# Patient Record
Sex: FEMALE | Race: WHITE | NOT HISPANIC OR LATINO | Employment: UNEMPLOYED | URBAN - NONMETROPOLITAN AREA
[De-identification: names, ages, dates, MRNs, and addresses within clinical notes are randomized per-mention and may not be internally consistent; named-entity substitution may affect disease eponyms.]

---

## 2017-11-01 ENCOUNTER — HOSPITAL ENCOUNTER (OUTPATIENT)
Dept: RADIATION ONCOLOGY | Facility: HOSPITAL | Age: 57
Setting detail: RADIATION/ONCOLOGY SERIES
End: 2017-11-01

## 2017-11-09 ENCOUNTER — OFFICE VISIT (OUTPATIENT)
Dept: RADIATION ONCOLOGY | Facility: HOSPITAL | Age: 57
End: 2017-11-09

## 2017-11-09 VITALS
DIASTOLIC BLOOD PRESSURE: 79 MMHG | WEIGHT: 293 LBS | BODY MASS INDEX: 45.99 KG/M2 | HEIGHT: 67 IN | SYSTOLIC BLOOD PRESSURE: 162 MMHG

## 2017-11-09 DIAGNOSIS — Z92.3 HISTORY OF RADIATION THERAPY: ICD-10-CM

## 2017-11-09 DIAGNOSIS — Z08 ENCOUNTER FOR FOLLOW-UP SURVEILLANCE OF CERVICAL CANCER: ICD-10-CM

## 2017-11-09 DIAGNOSIS — R32 URINARY INCONTINENCE, UNSPECIFIED TYPE: ICD-10-CM

## 2017-11-09 DIAGNOSIS — Z85.41 ENCOUNTER FOR FOLLOW-UP SURVEILLANCE OF CERVICAL CANCER: ICD-10-CM

## 2017-11-09 DIAGNOSIS — F17.200 CURRENT EVERY DAY SMOKER: ICD-10-CM

## 2017-11-09 DIAGNOSIS — C53.9 MALIGNANT NEOPLASM OF CERVIX, UNSPECIFIED SITE (HCC): Primary | ICD-10-CM

## 2017-11-09 PROCEDURE — G0123 SCREEN CERV/VAG THIN LAYER: HCPCS | Performed by: RADIOLOGY

## 2017-11-09 PROCEDURE — G0463 HOSPITAL OUTPT CLINIC VISIT: HCPCS | Performed by: RADIOLOGY

## 2017-11-09 RX ORDER — TIZANIDINE 4 MG/1
4 TABLET ORAL NIGHTLY PRN
COMMUNITY

## 2017-11-09 NOTE — PROGRESS NOTES
[facility]SMaryMD Rosanna Nichols, SEBASTIENN, PA-C  ____________________________________________________________  [facility]  Department of Radiation Oncology  [address] [place] [id number]  Office:  [phone]  Fax: [phone]    DATE:   11/09/2017    PATIENT:   Margarita Del Angel   1960                                 MEDICAL RECORD #:  [id number]    Reason for Visit:    1. Malignant neoplasm of cervix, unspecified site    2. History of radiation therapy    3. Current every day smoker    4. Urinary incontinence, unspecified type    5. Encounter for follow-up surveillance of cervical cancer       BRIEF HISTORY:   Margarita Del Angel is a very pleasant 57 y.o. patient that is status post radiation therapy for Stage IIB (T2b,N0,M0) cervical cancer with completion on 11/13/2014. This patient returns to the clinic today for routine follow up exam.  is doing well with no new significant treatment or disease related complaints. Reports increased urinary frequency and vaginal dryness, denies vaginal bleeding. Recent pap smear on 9/21/2016 was negative. She continues to follow with .    Ms. Del Angel completed a definitive course of radiation therapy for treatment of stage IIB (T2b N0 M0) cervix cancer. This patient presented to Dr. Merritt Payan for pelvic examination on 6/11/14.  A tumor was noted on the cervix with Pap smear positive for malignant cells.  A biopsy of the endometrium was also performed, showing poorly differential  squamous cell carcinoma, p63 positive.  A CT scan of abdomen and pelvis on 6/23/14 showed a bulky appearing cervix without CT evidence of parametrial extension of tumor.  The patient was referred to GYN oncology at [facility] and subsequently seen by Dr. Yogesh Genao on 7/9/14.  She was diagnosed with a clinical stage IIB cervical carcinoma with recommendations for definitive chemoradiation.  She received  EBRT with concomitant weekly Cisplatin as a radiosensitizer, followed by HDR brachytherapy. A dose of 5040 cGy was delivered to the pelvis + 5 HDR brachytherapy implants (3000 cGy). She completed the prescribed course of therapy on 11/13/14.    ONCOLOGY HISTORY     Cervix cancer     Initial Diagnosis     Cervix cancer    Staging form: Cervix Uteri, AJCC V7    - Clinical stage from 7/9/2014: Stage IIB (T2b, N0, M0) - Unsigned           6/11/2014 Procedure     Pap smear - epithelial cell abnormality, malignant cells are present  Endometrial biopsy - poorly differentiated squamous cell carcinoma, p63 with focal areas of positivity         6/16/2014 Imaging     Pelvis US:  Difficult to assess transabdominal images via secondary to patient's body habitus  No gross abnormality         6/16/2014 Imaging     Transvaginal US:  Endometrial thickness 4.0mm  Uterus appears irregular in shape consistent with leiomyomatous uterus; recommend clinical correlation          6/18/2014 Imaging     CXR:  No acute cardiac or pulmonary process         6/23/2014 Imaging     CT Abd/Pelvis:  Bulky appearing cervix, compatible with cervical carcinoma; within the inferior anterior cervix there is focal air and low attenuation change of uncertain significance; may be related to instrumentation, tumor necrosis would be difficult to exclude  Pelvic sidewalls are maintained without CT evidence of significant parametrial extension of tumor  No CT evidence of metastatic disease  Diastasis of the anterior abdominal wall associated with uncomplicated hernia expected in the region of the umbilicus         8/11/2014 - 11/3/2014 Radiation     Radiation OncologyTreatment Course:  Margarita Del Angel received 5040 cGy in 33 fractions to pelvis.  Patient also received 3000 cGy via HDR brachytherapy to cervix in 5 fractions.         1/6/2015 Imaging     CT Abd/Pelvis:  Mild soft tissue fullness in the region of the left aspect of the cervix which could represent  the site of malignancy; recommend correlation with physical examination and pelvic ultrasound if needed  Scattered colonic diverticula  Small, fat containing umbilical hernia  Atherosclerotic disease within the abdominal aorta and iliac arteries  Otherwise unremarkable CT of the abdomen and pelvis with no evidence of metastatic disease          History obtained from  PATIENT and CHART    PAST MEDICAL HISTORY   Past Medical History:   Diagnosis Date   • Cervical cancer    • Chronic back pain    • COPD (chronic obstructive pulmonary disease)    • Degenerative disc disease, lumbar    • History of radiation therapy 11/13/2014    5040 cGy to pelvis; 3000 cGy to cervix (HDR x 5)   • Osteoporosis       PAST SURGICAL HISTORY   Past Surgical History:   Procedure Laterality Date   • ENDOMETRIAL BIOPSY     • PORTACATH PLACEMENT Left 2014   • TUBAL ABDOMINAL LIGATION        SOCIAL HISTORY   Social History   Substance Use Topics   • Smoking status: Current Every Day Smoker     Packs/day: 2.00     Years: 40.00   • Smokeless tobacco: Never Used      Comment: Down to 1.5 packs a day   • Alcohol use Yes      Comment: Rarely      ALLERGIES   Sulfa antibiotics and Erythromycin     MEDICATIONS  Current Outpatient Prescriptions   Medication Sig Dispense Refill   • furosemide (LASIX) 40 MG tablet Take 40 mg by mouth.     • oxyCODONE-acetaminophen (PERCOCET)  MG per tablet Take 1 tablet by mouth every 6 (six) hours as needed for moderate pain (4-6).     • tiZANidine (ZANAFLEX) 4 MG tablet Take 4 mg by mouth At Night As Needed for Muscle Spasms.       No current facility-administered medications for this visit.       The following portions of the patient's history were reviewed and updated as appropriate: allergies, current medications, past family history, past medical history, past social history, past surgical history and problem list.    REVIEW OF SYSTEMS   Review of Systems   Constitutional: Positive for fatigue. Negative for  "activity change, appetite change, chills, diaphoresis, fever and unexpected weight change.   HENT: Negative.    Eyes: Negative.    Respiratory: Positive for cough, shortness of breath and wheezing. Negative for apnea, choking, chest tightness and stridor.         Still complaining of breathing problems  Saw Allan ALBERT without relief.  Has an inhaler she uses as needed    Cardiovascular: Negative.    Gastrointestinal: Negative.    Endocrine: Negative.         Hot flashes   Genitourinary:        Urinary incontinence  Urge incontinence   Musculoskeletal: Negative.         Bilateral feet edema  Chronic back pain  Saw Panfilo for back pain; had injections but they did not seem to help.         Skin: Negative.    Allergic/Immunologic: Negative.    Neurological: Negative.    Hematological: Negative.    Psychiatric/Behavioral: Negative.      PHYSICAL EXAM   VITAL SIGNS:   Vitals:    11/09/17 1457   BP: 162/79   Weight: (!) 301 lb (137 kg)   Height: 67\" (170.2 cm)   PainSc: 0-No pain  Comment: Chronic back pain   Body mass index is 47.14 kg/(m^2).    General Appearance:    Alert, cooperative, no acute distress, appears stated age.   Vitals reviewed.   Head:    Normocephalic, without obvious abnormality, atraumatic   Eyes:    PERRL, conjunctiva clear       Nose:   Nares normal, septum midline, mucosa normal   Throat:   Lips, mucosa, and tongue normal; teeth and gums normal   Back:     Symmetric, no curvature, ROM normal, no CVA tenderness   Lungs:     Clear to auscultation bilaterally, respirations unlabored   Heart:    Regular rate and rhythm, S1 and S2 normal     Abdomen:    Soft, non-tender, bowel sounds active all four quadrants,     no masses, no organomegaly   Genitalia:    Pelvic exam: normal external genitalia, vulva, vagina, cervix, uterus and adnexa, CERVIX: normal appearing cervix without discharge or lesions, cervical motion tenderness absent, UTERUS: uterus is normal size, shape, consistency and nontender, " PAP: Pap smear done today, exam chaperoned by nurse   Extremities:   Extremities normal, atraumatic, no cyanosis or edema   Skin:   Skin color, texture, turgor normal, no rashes or lesions   Neurologic:   Normal strength, sensation and reflexes throughout                     Psych exam:   alert,oriented, normal situational behavior        Clinical Quality Measures   Pain Documented PQRS #131 Pain severity quantified; no pain present, no followup plan required.   Care Plan: ADVANCED CARE PQRS #47 Care plan discussed, no care plan provided   Performance Status: ECOG (0) Fully active, able to carry on all predisease performance without restriction   TOBACCO SCREENING AND INTERVENTION  PQRS #226 Tobacco user, received tobacco cessation counseling. Current every day smoker 3-10 mintues spent counseling Will try to cut down    Medications Documented PQRS #130 Medications documented   WEIGHT SCREENING/BMI  Not eligible, overweight & managed by other physician    ASSESSMENT AND PLAN  1. Malignant neoplasm of cervix, unspecified site    2. History of radiation therapy    3. Current every day smoker    4. Urinary incontinence, unspecified type    5. Encounter for follow-up surveillance of cervical cancer      RECOMMENDATIONS:  Margarita Del Agnel presents to our clinic today after completion of radiation therapy for Stage IIB (T2b,N0,M0) cervical cancer. Patient is without symptoms or evidence for recurrent or metastatic disease at this time. Pap Smear completed - notify patient of results.  WIll see the patient back in routine follow up or sooner if needed.    I advised the patient of the risks in continuing to use tobacco, and I provided this patient with smoking cessation educational materials.  I also discussed how to quit smoking and the patient has expressed the willingness to quit.  Return in about 1 year (around 11/9/2018) for Office Visit, for a routine follow up, re-evaluation and examination.       Patient Instructions    Call in 2 weeks if not received results of pap.   Follow up with Dr. Wrgiht in 1 year.    Today, total time spent with this patient was 26 minutes and of that time, well over 50% was spent in counseling and coordination of care as follows: diagnosis, post-treatment coordination of care and radiation therapy in general    Chi Wright MD  11/09/2017  3:01 PM

## 2017-11-17 LAB
GEN CATEG CVX/VAG CYTO-IMP: NORMAL
LAB AP CASE REPORT: NORMAL
LAB AP GYN ADDITIONAL INFORMATION: NORMAL
LAB AP GYN OTHER FINDINGS: NORMAL
Lab: NORMAL
PATH INTERP SPEC-IMP: NORMAL
STAT OF ADQ CVX/VAG CYTO-IMP: NORMAL

## 2020-02-14 ENCOUNTER — HOSPITAL ENCOUNTER (INPATIENT)
Age: 60
LOS: 3 days | Discharge: HOME | DRG: 189 | End: 2020-02-17
Attending: EMERGENCY MEDICINE | Admitting: HOSPITALIST
Payer: MEDICAID

## 2020-02-14 ENCOUNTER — APPOINTMENT (OUTPATIENT)
Dept: GENERAL RADIOLOGY | Age: 60
DRG: 189 | End: 2020-02-14
Payer: MEDICAID

## 2020-02-14 ENCOUNTER — APPOINTMENT (OUTPATIENT)
Dept: CT IMAGING | Age: 60
DRG: 189 | End: 2020-02-14
Payer: MEDICAID

## 2020-02-14 PROBLEM — J96.22 ACUTE ON CHRONIC RESPIRATORY FAILURE WITH HYPERCAPNIA (HCC): Status: ACTIVE | Noted: 2020-02-14

## 2020-02-14 LAB
ALBUMIN SERPL-MCNC: 3.3 G/DL (ref 3.5–5.2)
ALP BLD-CCNC: 80 U/L (ref 35–104)
ALT SERPL-CCNC: 18 U/L (ref 5–33)
ANION GAP SERPL CALCULATED.3IONS-SCNC: 14 MMOL/L (ref 7–19)
APTT: 29.7 SEC (ref 26–36.2)
AST SERPL-CCNC: 15 U/L (ref 5–32)
BACTERIA: ABNORMAL /HPF
BASE EXCESS ARTERIAL: 1 MMOL/L (ref -2–2)
BASOPHILS ABSOLUTE: 0.1 K/UL (ref 0–0.2)
BASOPHILS RELATIVE PERCENT: 0.4 % (ref 0–1)
BILIRUB SERPL-MCNC: 0.5 MG/DL (ref 0.2–1.2)
BILIRUBIN URINE: ABNORMAL
BLOOD, URINE: ABNORMAL
BUN BLDV-MCNC: 19 MG/DL (ref 8–23)
CALCIUM SERPL-MCNC: 9 MG/DL (ref 8.8–10.2)
CARBOXYHEMOGLOBIN ARTERIAL: 2.5 % (ref 0–5)
CHLORIDE BLD-SCNC: 97 MMOL/L (ref 98–111)
CLARITY: ABNORMAL
CO2: 26 MMOL/L (ref 22–29)
COLOR: ABNORMAL
CREAT SERPL-MCNC: 0.9 MG/DL (ref 0.5–0.9)
D DIMER: 3.59 UG/ML FEU (ref 0–0.48)
EOSINOPHILS ABSOLUTE: 0.1 K/UL (ref 0–0.6)
EOSINOPHILS RELATIVE PERCENT: 0.4 % (ref 0–5)
EPITHELIAL CELLS, UA: ABNORMAL /HPF
GFR NON-AFRICAN AMERICAN: >60
GLUCOSE BLD-MCNC: 155 MG/DL (ref 74–109)
GLUCOSE URINE: NEGATIVE MG/DL
HCO3 ARTERIAL: 26.6 MMOL/L (ref 22–26)
HCT VFR BLD CALC: 47.8 % (ref 37–47)
HEMOGLOBIN, ART, EXTENDED: 14.4 G/DL (ref 12–16)
HEMOGLOBIN: 14.3 G/DL (ref 12–16)
IMMATURE GRANULOCYTES #: 0.1 K/UL
INR BLD: 1.19 (ref 0.88–1.18)
KETONES, URINE: ABNORMAL MG/DL
LACTIC ACID: 1.5 MMOL/L (ref 0.5–1.9)
LACTIC ACID: 1.8 MMOL/L (ref 0.5–1.9)
LEUKOCYTE ESTERASE, URINE: ABNORMAL
LYMPHOCYTES ABSOLUTE: 1.1 K/UL (ref 1.1–4.5)
LYMPHOCYTES RELATIVE PERCENT: 6.8 % (ref 20–40)
MAGNESIUM: 2.1 MG/DL (ref 1.6–2.4)
MCH RBC QN AUTO: 25 PG (ref 27–31)
MCHC RBC AUTO-ENTMCNC: 29.9 G/DL (ref 33–37)
MCV RBC AUTO: 83.4 FL (ref 81–99)
METHEMOGLOBIN ARTERIAL: 2 %
MONOCYTES ABSOLUTE: 1.3 K/UL (ref 0–0.9)
MONOCYTES RELATIVE PERCENT: 7.6 % (ref 0–10)
NEUTROPHILS ABSOLUTE: 14.1 K/UL (ref 1.5–7.5)
NEUTROPHILS RELATIVE PERCENT: 84.4 % (ref 50–65)
NITRITE, URINE: NEGATIVE
O2 CONTENT ARTERIAL: 16.3 ML/DL
O2 SAT, ARTERIAL: 80.7 %
O2 THERAPY: ABNORMAL
PCO2 ARTERIAL: 45 MMHG (ref 35–45)
PDW BLD-RTO: 19.6 % (ref 11.5–14.5)
PH ARTERIAL: 7.38 (ref 7.35–7.45)
PH UA: 5.5 (ref 5–8)
PLATELET # BLD: 244 K/UL (ref 130–400)
PMV BLD AUTO: 10.8 FL (ref 9.4–12.3)
PO2 ARTERIAL: 45 MMHG (ref 80–100)
POTASSIUM SERPL-SCNC: 3.9 MMOL/L (ref 3.5–5)
POTASSIUM, WHOLE BLOOD: 3.8
PRO-BNP: 181 PG/ML (ref 0–900)
PROTEIN UA: 100 MG/DL
PROTHROMBIN TIME: 14.5 SEC (ref 12–14.6)
RAPID INFLUENZA  B AGN: NEGATIVE
RAPID INFLUENZA A AGN: NEGATIVE
RBC # BLD: 5.73 M/UL (ref 4.2–5.4)
RBC UA: ABNORMAL /HPF (ref 0–2)
SODIUM BLD-SCNC: 137 MMOL/L (ref 136–145)
SPECIFIC GRAVITY UA: 1.04 (ref 1–1.03)
TOTAL PROTEIN: 7.1 G/DL (ref 6.6–8.7)
TROPONIN: <0.01 NG/ML (ref 0–0.03)
TSH SERPL DL<=0.05 MIU/L-ACNC: 2.99 UIU/ML (ref 0.27–4.2)
URINE REFLEX TO CULTURE: YES
UROBILINOGEN, URINE: 1 E.U./DL
WBC # BLD: 16.7 K/UL (ref 4.8–10.8)
WBC UA: ABNORMAL /HPF (ref 0–5)

## 2020-02-14 PROCEDURE — 96375 TX/PRO/DX INJ NEW DRUG ADDON: CPT

## 2020-02-14 PROCEDURE — 85730 THROMBOPLASTIN TIME PARTIAL: CPT

## 2020-02-14 PROCEDURE — 87086 URINE CULTURE/COLONY COUNT: CPT

## 2020-02-14 PROCEDURE — 84443 ASSAY THYROID STIM HORMONE: CPT

## 2020-02-14 PROCEDURE — 71045 X-RAY EXAM CHEST 1 VIEW: CPT

## 2020-02-14 PROCEDURE — 6360000004 HC RX CONTRAST MEDICATION: Performed by: EMERGENCY MEDICINE

## 2020-02-14 PROCEDURE — 83735 ASSAY OF MAGNESIUM: CPT

## 2020-02-14 PROCEDURE — 2580000003 HC RX 258: Performed by: HOSPITALIST

## 2020-02-14 PROCEDURE — 2580000003 HC RX 258: Performed by: EMERGENCY MEDICINE

## 2020-02-14 PROCEDURE — 87081 CULTURE SCREEN ONLY: CPT

## 2020-02-14 PROCEDURE — 96374 THER/PROPH/DIAG INJ IV PUSH: CPT

## 2020-02-14 PROCEDURE — 81001 URINALYSIS AUTO W/SCOPE: CPT

## 2020-02-14 PROCEDURE — 6360000002 HC RX W HCPCS: Performed by: EMERGENCY MEDICINE

## 2020-02-14 PROCEDURE — 6370000000 HC RX 637 (ALT 250 FOR IP): Performed by: EMERGENCY MEDICINE

## 2020-02-14 PROCEDURE — 36600 WITHDRAWAL OF ARTERIAL BLOOD: CPT

## 2020-02-14 PROCEDURE — 84132 ASSAY OF SERUM POTASSIUM: CPT

## 2020-02-14 PROCEDURE — 87040 BLOOD CULTURE FOR BACTERIA: CPT

## 2020-02-14 PROCEDURE — 85610 PROTHROMBIN TIME: CPT

## 2020-02-14 PROCEDURE — 83880 ASSAY OF NATRIURETIC PEPTIDE: CPT

## 2020-02-14 PROCEDURE — 36415 COLL VENOUS BLD VENIPUNCTURE: CPT

## 2020-02-14 PROCEDURE — 82803 BLOOD GASES ANY COMBINATION: CPT

## 2020-02-14 PROCEDURE — 71275 CT ANGIOGRAPHY CHEST: CPT

## 2020-02-14 PROCEDURE — 84484 ASSAY OF TROPONIN QUANT: CPT

## 2020-02-14 PROCEDURE — 93005 ELECTROCARDIOGRAM TRACING: CPT

## 2020-02-14 PROCEDURE — 85379 FIBRIN DEGRADATION QUANT: CPT

## 2020-02-14 PROCEDURE — 85025 COMPLETE CBC W/AUTO DIFF WBC: CPT

## 2020-02-14 PROCEDURE — 99285 EMERGENCY DEPT VISIT HI MDM: CPT

## 2020-02-14 PROCEDURE — 2000000000 HC ICU R&B

## 2020-02-14 PROCEDURE — 2700000000 HC OXYGEN THERAPY PER DAY

## 2020-02-14 PROCEDURE — 80053 COMPREHEN METABOLIC PANEL: CPT

## 2020-02-14 PROCEDURE — 83605 ASSAY OF LACTIC ACID: CPT

## 2020-02-14 PROCEDURE — 87804 INFLUENZA ASSAY W/OPTIC: CPT

## 2020-02-14 PROCEDURE — 94640 AIRWAY INHALATION TREATMENT: CPT

## 2020-02-14 RX ORDER — TRAZODONE HYDROCHLORIDE 50 MG/1
50 TABLET ORAL NIGHTLY
COMMUNITY

## 2020-02-14 RX ORDER — METHYLPREDNISOLONE SODIUM SUCCINATE 125 MG/2ML
125 INJECTION, POWDER, LYOPHILIZED, FOR SOLUTION INTRAMUSCULAR; INTRAVENOUS ONCE
Status: COMPLETED | OUTPATIENT
Start: 2020-02-14 | End: 2020-02-14

## 2020-02-14 RX ORDER — OXYCODONE AND ACETAMINOPHEN 10; 325 MG/1; MG/1
1 TABLET ORAL EVERY 8 HOURS PRN
COMMUNITY

## 2020-02-14 RX ORDER — IPRATROPIUM BROMIDE AND ALBUTEROL SULFATE 2.5; .5 MG/3ML; MG/3ML
1 SOLUTION RESPIRATORY (INHALATION)
Status: DISCONTINUED | OUTPATIENT
Start: 2020-02-15 | End: 2020-02-15

## 2020-02-14 RX ORDER — POTASSIUM CHLORIDE 7.45 MG/ML
10 INJECTION INTRAVENOUS PRN
Status: DISCONTINUED | OUTPATIENT
Start: 2020-02-14 | End: 2020-02-17 | Stop reason: HOSPADM

## 2020-02-14 RX ORDER — POTASSIUM CHLORIDE 20 MEQ/1
40 TABLET, EXTENDED RELEASE ORAL PRN
Status: DISCONTINUED | OUTPATIENT
Start: 2020-02-14 | End: 2020-02-17 | Stop reason: HOSPADM

## 2020-02-14 RX ORDER — ACETAMINOPHEN 325 MG/1
650 TABLET ORAL EVERY 4 HOURS PRN
Status: DISCONTINUED | OUTPATIENT
Start: 2020-02-14 | End: 2020-02-17 | Stop reason: HOSPADM

## 2020-02-14 RX ORDER — FUROSEMIDE 40 MG/1
40 TABLET ORAL DAILY
COMMUNITY

## 2020-02-14 RX ORDER — POTASSIUM CHLORIDE 7.45 MG/ML
10 INJECTION INTRAVENOUS PRN
Status: DISCONTINUED | OUTPATIENT
Start: 2020-02-14 | End: 2020-02-15

## 2020-02-14 RX ORDER — SODIUM CHLORIDE 0.9 % (FLUSH) 0.9 %
10 SYRINGE (ML) INJECTION PRN
Status: DISCONTINUED | OUTPATIENT
Start: 2020-02-14 | End: 2020-02-17 | Stop reason: HOSPADM

## 2020-02-14 RX ORDER — LISINOPRIL 20 MG/1
20 TABLET ORAL DAILY
COMMUNITY

## 2020-02-14 RX ORDER — ONDANSETRON 2 MG/ML
4 INJECTION INTRAMUSCULAR; INTRAVENOUS EVERY 6 HOURS PRN
Status: DISCONTINUED | OUTPATIENT
Start: 2020-02-14 | End: 2020-02-17 | Stop reason: HOSPADM

## 2020-02-14 RX ORDER — SODIUM CHLORIDE 0.9 % (FLUSH) 0.9 %
10 SYRINGE (ML) INJECTION EVERY 12 HOURS SCHEDULED
Status: DISCONTINUED | OUTPATIENT
Start: 2020-02-14 | End: 2020-02-17 | Stop reason: HOSPADM

## 2020-02-14 RX ORDER — MAGNESIUM SULFATE 1 G/100ML
1 INJECTION INTRAVENOUS PRN
Status: DISCONTINUED | OUTPATIENT
Start: 2020-02-14 | End: 2020-02-17 | Stop reason: HOSPADM

## 2020-02-14 RX ORDER — IPRATROPIUM BROMIDE AND ALBUTEROL SULFATE 2.5; .5 MG/3ML; MG/3ML
1 SOLUTION RESPIRATORY (INHALATION) ONCE
Status: COMPLETED | OUTPATIENT
Start: 2020-02-14 | End: 2020-02-14

## 2020-02-14 RX ORDER — SODIUM CHLORIDE 9 MG/ML
INJECTION, SOLUTION INTRAVENOUS CONTINUOUS
Status: DISCONTINUED | OUTPATIENT
Start: 2020-02-14 | End: 2020-02-17 | Stop reason: HOSPADM

## 2020-02-14 RX ADMIN — IOPAMIDOL 90 ML: 755 INJECTION, SOLUTION INTRAVENOUS at 18:32

## 2020-02-14 RX ADMIN — Medication 500 MG: at 20:17

## 2020-02-14 RX ADMIN — SODIUM CHLORIDE, PRESERVATIVE FREE 10 ML: 5 INJECTION INTRAVENOUS at 22:18

## 2020-02-14 RX ADMIN — METHYLPREDNISOLONE SODIUM SUCCINATE 125 MG: 125 INJECTION, POWDER, FOR SOLUTION INTRAMUSCULAR; INTRAVENOUS at 17:44

## 2020-02-14 RX ADMIN — SODIUM CHLORIDE: 9 INJECTION, SOLUTION INTRAVENOUS at 22:18

## 2020-02-14 RX ADMIN — IPRATROPIUM BROMIDE AND ALBUTEROL SULFATE 1 AMPULE: .5; 3 SOLUTION RESPIRATORY (INHALATION) at 17:46

## 2020-02-14 RX ADMIN — CEFTRIAXONE 1 G: 1 INJECTION, POWDER, FOR SOLUTION INTRAMUSCULAR; INTRAVENOUS at 20:17

## 2020-02-14 ASSESSMENT — ENCOUNTER SYMPTOMS
APNEA: 0
SORE THROAT: 0
SINUS PRESSURE: 0
DIARRHEA: 0
WHEEZING: 1
CHOKING: 0
SHORTNESS OF BREATH: 1
FACIAL SWELLING: 0
NAUSEA: 0
BLOOD IN STOOL: 0
VOICE CHANGE: 0
EYE DISCHARGE: 0
COUGH: 1
CONSTIPATION: 0

## 2020-02-14 ASSESSMENT — PAIN SCALES - GENERAL: PAINLEVEL_OUTOF10: 0

## 2020-02-14 NOTE — PROGRESS NOTES
BLOOD GAS, ARTERIAL [[id number]] (Abnormal) Collected: 02/14/20 1656     Specimen: Blood gases Updated: 02/14/20 1702      pH, Arterial 7.380      pCO2, Arterial 45.0 mmHg       pO2, Arterial 45.0 mmHg       HCO3, Arterial 26.6 mmol/L       Base Excess, Arterial 1.0 mmol/L       Hemoglobin, Art, Extended 14.4 g/dL       O2 Sat, Arterial 80.7 %       Carboxyhgb, Arterial 2.5 %       Methemoglobin, Arterial 2.0 %       O2 Content, Arterial 16.3 mL/dL       O2 Therapy Unknown     Narrative:       CALL  Thapa  RITU Henderson RN, 02/14/2020 17:02, by Rossy Chaney     Potassium, Whole Blood [[id number]] Collected: 02/14/20 1656      Updated: 02/14/20 1702      Potassium, Whole Blood 3.8     Narrative:       CALL  Thapa RITU Camargo RN, 02/14/2020 17:02, by LAURA Norton+, L rad, R/A, RR = 20

## 2020-02-14 NOTE — ED NOTES
Bed: 08  Expected date:   Expected time:   Means of arrival:   Comments:  EMS     Carlos Rodriguez RN  02/14/20 0895

## 2020-02-15 ENCOUNTER — OUTSIDE FACILITY SERVICE (OUTPATIENT)
Dept: PULMONOLOGY | Facility: CLINIC | Age: 60
End: 2020-02-15

## 2020-02-15 LAB
ANION GAP SERPL CALCULATED.3IONS-SCNC: 17 MMOL/L (ref 7–19)
BUN BLDV-MCNC: 21 MG/DL (ref 8–23)
CALCIUM SERPL-MCNC: 8.7 MG/DL (ref 8.8–10.2)
CHLORIDE BLD-SCNC: 98 MMOL/L (ref 98–111)
CO2: 25 MMOL/L (ref 22–29)
CREAT SERPL-MCNC: 0.9 MG/DL (ref 0.5–0.9)
GFR NON-AFRICAN AMERICAN: >60
GLUCOSE BLD-MCNC: 197 MG/DL (ref 74–109)
HCT VFR BLD CALC: 45.1 % (ref 37–47)
HEMOGLOBIN: 13.1 G/DL (ref 12–16)
MCH RBC QN AUTO: 25 PG (ref 27–31)
MCHC RBC AUTO-ENTMCNC: 29 G/DL (ref 33–37)
MCV RBC AUTO: 86.2 FL (ref 81–99)
PDW BLD-RTO: 18.9 % (ref 11.5–14.5)
PLATELET # BLD: 257 K/UL (ref 130–400)
PMV BLD AUTO: 10.8 FL (ref 9.4–12.3)
POTASSIUM REFLEX MAGNESIUM: 4.3 MMOL/L (ref 3.5–5)
RBC # BLD: 5.23 M/UL (ref 4.2–5.4)
SODIUM BLD-SCNC: 140 MMOL/L (ref 136–145)
WBC # BLD: 17.7 K/UL (ref 4.8–10.8)

## 2020-02-15 PROCEDURE — 36415 COLL VENOUS BLD VENIPUNCTURE: CPT

## 2020-02-15 PROCEDURE — 85027 COMPLETE CBC AUTOMATED: CPT

## 2020-02-15 PROCEDURE — 6370000000 HC RX 637 (ALT 250 FOR IP): Performed by: HOSPITALIST

## 2020-02-15 PROCEDURE — 2580000003 HC RX 258: Performed by: HOSPITALIST

## 2020-02-15 PROCEDURE — 6360000002 HC RX W HCPCS: Performed by: HOSPITALIST

## 2020-02-15 PROCEDURE — 80048 BASIC METABOLIC PNL TOTAL CA: CPT

## 2020-02-15 PROCEDURE — 2700000000 HC OXYGEN THERAPY PER DAY

## 2020-02-15 PROCEDURE — 94640 AIRWAY INHALATION TREATMENT: CPT

## 2020-02-15 PROCEDURE — 1210000000 HC MED SURG R&B

## 2020-02-15 PROCEDURE — 99254 IP/OBS CNSLTJ NEW/EST MOD 60: CPT | Performed by: INTERNAL MEDICINE

## 2020-02-15 RX ORDER — METHYLPREDNISOLONE SODIUM SUCCINATE 40 MG/ML
40 INJECTION, POWDER, LYOPHILIZED, FOR SOLUTION INTRAMUSCULAR; INTRAVENOUS DAILY
Status: DISCONTINUED | OUTPATIENT
Start: 2020-02-15 | End: 2020-02-16

## 2020-02-15 RX ORDER — FUROSEMIDE 40 MG/1
40 TABLET ORAL DAILY
Status: DISCONTINUED | OUTPATIENT
Start: 2020-02-15 | End: 2020-02-17 | Stop reason: HOSPADM

## 2020-02-15 RX ORDER — IPRATROPIUM BROMIDE AND ALBUTEROL SULFATE 2.5; .5 MG/3ML; MG/3ML
1 SOLUTION RESPIRATORY (INHALATION) 4 TIMES DAILY
Status: DISCONTINUED | OUTPATIENT
Start: 2020-02-15 | End: 2020-02-17 | Stop reason: HOSPADM

## 2020-02-15 RX ORDER — PANTOPRAZOLE SODIUM 40 MG/1
40 TABLET, DELAYED RELEASE ORAL
Status: DISCONTINUED | OUTPATIENT
Start: 2020-02-16 | End: 2020-02-17 | Stop reason: HOSPADM

## 2020-02-15 RX ORDER — TRAZODONE HYDROCHLORIDE 50 MG/1
50 TABLET ORAL NIGHTLY
Status: DISCONTINUED | OUTPATIENT
Start: 2020-02-15 | End: 2020-02-17 | Stop reason: HOSPADM

## 2020-02-15 RX ORDER — NALOXONE HYDROCHLORIDE 0.4 MG/ML
0.4 INJECTION, SOLUTION INTRAMUSCULAR; INTRAVENOUS; SUBCUTANEOUS PRN
Status: DISCONTINUED | OUTPATIENT
Start: 2020-02-15 | End: 2020-02-17 | Stop reason: HOSPADM

## 2020-02-15 RX ORDER — ALBUTEROL SULFATE 2.5 MG/3ML
2.5 SOLUTION RESPIRATORY (INHALATION)
Status: DISCONTINUED | OUTPATIENT
Start: 2020-02-15 | End: 2020-02-17 | Stop reason: HOSPADM

## 2020-02-15 RX ORDER — BUDESONIDE 0.5 MG/2ML
0.5 INHALANT ORAL EVERY 12 HOURS
Status: DISCONTINUED | OUTPATIENT
Start: 2020-02-15 | End: 2020-02-17 | Stop reason: HOSPADM

## 2020-02-15 RX ORDER — ASPIRIN 81 MG/1
81 TABLET ORAL DAILY
Status: DISCONTINUED | OUTPATIENT
Start: 2020-02-15 | End: 2020-02-17 | Stop reason: HOSPADM

## 2020-02-15 RX ORDER — OXYCODONE AND ACETAMINOPHEN 10; 325 MG/1; MG/1
1 TABLET ORAL EVERY 8 HOURS PRN
Status: DISCONTINUED | OUTPATIENT
Start: 2020-02-15 | End: 2020-02-17 | Stop reason: HOSPADM

## 2020-02-15 RX ORDER — LISINOPRIL 20 MG/1
20 TABLET ORAL DAILY
Status: DISCONTINUED | OUTPATIENT
Start: 2020-02-15 | End: 2020-02-17 | Stop reason: HOSPADM

## 2020-02-15 RX ADMIN — SODIUM CHLORIDE, PRESERVATIVE FREE 10 ML: 5 INJECTION INTRAVENOUS at 08:50

## 2020-02-15 RX ADMIN — FUROSEMIDE 40 MG: 40 TABLET ORAL at 12:02

## 2020-02-15 RX ADMIN — IPRATROPIUM BROMIDE AND ALBUTEROL SULFATE 1 AMPULE: .5; 3 SOLUTION RESPIRATORY (INHALATION) at 10:11

## 2020-02-15 RX ADMIN — AZITHROMYCIN 500 MG: 500 INJECTION, POWDER, LYOPHILIZED, FOR SOLUTION INTRAVENOUS at 20:15

## 2020-02-15 RX ADMIN — IPRATROPIUM BROMIDE AND ALBUTEROL SULFATE 1 AMPULE: .5; 3 SOLUTION RESPIRATORY (INHALATION) at 19:45

## 2020-02-15 RX ADMIN — METHYLPREDNISOLONE SODIUM SUCCINATE 40 MG: 40 INJECTION, POWDER, FOR SOLUTION INTRAMUSCULAR; INTRAVENOUS at 12:02

## 2020-02-15 RX ADMIN — SODIUM CHLORIDE, PRESERVATIVE FREE 1 G: 5 INJECTION INTRAVENOUS at 20:14

## 2020-02-15 RX ADMIN — OXYCODONE HYDROCHLORIDE AND ACETAMINOPHEN 1 TABLET: 10; 325 TABLET ORAL at 18:26

## 2020-02-15 RX ADMIN — IPRATROPIUM BROMIDE AND ALBUTEROL SULFATE 1 AMPULE: .5; 3 SOLUTION RESPIRATORY (INHALATION) at 13:54

## 2020-02-15 RX ADMIN — LISINOPRIL 20 MG: 20 TABLET ORAL at 12:02

## 2020-02-15 RX ADMIN — SODIUM CHLORIDE: 9 INJECTION, SOLUTION INTRAVENOUS at 12:28

## 2020-02-15 RX ADMIN — BUDESONIDE 500 MCG: 0.5 INHALANT RESPIRATORY (INHALATION) at 19:45

## 2020-02-15 RX ADMIN — SODIUM CHLORIDE, PRESERVATIVE FREE 10 ML: 5 INJECTION INTRAVENOUS at 20:15

## 2020-02-15 RX ADMIN — IPRATROPIUM BROMIDE AND ALBUTEROL SULFATE 1 AMPULE: .5; 3 SOLUTION RESPIRATORY (INHALATION) at 05:47

## 2020-02-15 RX ADMIN — ENOXAPARIN SODIUM 40 MG: 40 INJECTION SUBCUTANEOUS at 08:50

## 2020-02-15 RX ADMIN — TRAZODONE HYDROCHLORIDE 50 MG: 50 TABLET ORAL at 20:15

## 2020-02-15 RX ADMIN — ASPIRIN 81 MG: 81 TABLET, COATED ORAL at 12:02

## 2020-02-15 ASSESSMENT — PAIN DESCRIPTION - LOCATION
LOCATION_2: HIP
LOCATION: BACK
LOCATION_2: HIP
LOCATION_2: HIP

## 2020-02-15 ASSESSMENT — PAIN DESCRIPTION - PAIN TYPE
TYPE_2: CHRONIC PAIN
TYPE: CHRONIC PAIN
TYPE_2: CHRONIC PAIN
TYPE_2: CHRONIC PAIN

## 2020-02-15 ASSESSMENT — PAIN DESCRIPTION - ORIENTATION
ORIENTATION: LOWER

## 2020-02-15 ASSESSMENT — PAIN DESCRIPTION - DESCRIPTORS
DESCRIPTORS: ACHING
DESCRIPTORS_2: ACHING
DESCRIPTORS: ACHING
DESCRIPTORS: ACHING

## 2020-02-15 ASSESSMENT — PAIN DESCRIPTION - DURATION
DURATION_2: CONTINUOUS

## 2020-02-15 ASSESSMENT — PAIN SCALES - GENERAL
PAINLEVEL_OUTOF10: 0
PAINLEVEL_OUTOF10: 8
PAINLEVEL_OUTOF10: 8
PAINLEVEL_OUTOF10: 6
PAINLEVEL_OUTOF10: 0

## 2020-02-15 ASSESSMENT — PAIN DESCRIPTION - PROGRESSION
CLINICAL_PROGRESSION_2: NOT CHANGED
CLINICAL_PROGRESSION: NOT CHANGED
CLINICAL_PROGRESSION: GRADUALLY IMPROVING
CLINICAL_PROGRESSION_2: NOT CHANGED
CLINICAL_PROGRESSION: NOT CHANGED
CLINICAL_PROGRESSION_2: GRADUALLY IMPROVING

## 2020-02-15 ASSESSMENT — PAIN DESCRIPTION - INTENSITY
RATING_2: 8
RATING_2: 8
RATING_2: 6

## 2020-02-15 ASSESSMENT — PAIN DESCRIPTION - FREQUENCY
FREQUENCY: CONTINUOUS

## 2020-02-15 ASSESSMENT — PAIN DESCRIPTION - ONSET
ONSET: ON-GOING
ONSET_2: ON-GOING
ONSET: ON-GOING
ONSET: ON-GOING

## 2020-02-15 ASSESSMENT — PAIN - FUNCTIONAL ASSESSMENT
PAIN_FUNCTIONAL_ASSESSMENT: ACTIVITIES ARE NOT PREVENTED
PAIN_FUNCTIONAL_ASSESSMENT: ACTIVITIES ARE NOT PREVENTED

## 2020-02-15 NOTE — ED PROVIDER NOTES
[facility]  eMERGENCY dEPARTMENT eNCOUnter      Pt Name: Fran Toney. Kurt Hernandez  MRN: [id number]  Birthdate 1960  Date of evaluation: 2/14/2020  Provider: Trey Ayon MD    [address] [place] [address]       Chief Complaint   Patient presents with    Shortness of Breath    Tachycardia    Fever         HISTORY OF PRESENT ILLNESS   (Location/Symptom, Timing/Onset,Context/Setting, Quality, Duration, Modifying Factors, Severity)  Note limiting factors. Karely Hernandez is a 61 y.o. female who presents to the emergency department evaluation of shortness of breath. 31-year-old female presents with for 5-day history of increasing shortness of breath and cough. She has had a history of pneumonia before. Has even had home oxygen at one time for 4. Has nebulizers at home. The history is provided by the patient and medical records. NursingNotes were reviewed. REVIEW OF SYSTEMS    (2-9 systems for level 4, 10 or more for level 5)     Review of Systems   Constitutional: Positive for chills. Negative for fever. HENT: Negative for congestion, drooling, facial swelling, nosebleeds, sinus pressure, sore throat and voice change. Eyes: Negative for discharge. Respiratory: Positive for cough, shortness of breath and wheezing. Negative for apnea and choking. Cardiovascular: Negative for chest pain and leg swelling. Gastrointestinal: Negative for blood in stool, constipation, diarrhea and nausea. Genitourinary: Negative for dysuria and enuresis. Musculoskeletal: Negative for joint swelling. Skin: Negative for rash and wound. Neurological: Negative for seizures and syncope. Psychiatric/Behavioral: Negative for behavioral problems, hallucinations and suicidal ideas. All other systems reviewed and are negative. A complete review of systems was performed and is negative except as noted above in the HPI. PAST MEDICAL HISTORY   No past medical history on file.       SURGICAL HISTORY     No past surgical history on file. CURRENT MEDICATIONS       Previous Medications    No medications on file       ALLERGIES     Sulfa antibiotics    FAMILY HISTORY     No family history on file.        SOCIAL HISTORY       Social History     Socioeconomic History    Marital status:      Spouse name: Not on file    Number of children: Not on file    Years of education: Not on file    Highest education level: Not on file   Occupational History    Not on file   Social Needs    Financial resource strain: Not on file    Food insecurity:     Worry: Not on file     Inability: Not on file    Transportation needs:     Medical: Not on file     Non-medical: Not on file   Tobacco Use    Smoking status: Not on file   Substance and Sexual Activity    Alcohol use: Not on file    Drug use: Not on file    Sexual activity: Not on file   Lifestyle    Physical activity:     Days per week: Not on file     Minutes per session: Not on file    Stress: Not on file   Relationships    Social connections:     Talks on phone: Not on file     Gets together: Not on file     Attends [employer] service: Not on file     Active member of club or organization: Not on file     Attends meetings of clubs or organizations: Not on file     Relationship status: Not on file    Intimate partner violence:     Fear of current or ex partner: Not on file     Emotionally abused: Not on file     Physically abused: Not on file     Forced sexual activity: Not on file   Other Topics Concern    Not on file   Social History Narrative    Not on file       SCREENINGS             PHYSICAL EXAM    (up to 7 for level 4, 8 or more for level 5)     ED Triage Vitals   BP Temp Temp Source Pulse Resp SpO2 Height Weight   02/14/20 1745 02/14/20 1653 02/14/20 1653 02/14/20 1652 02/14/20 1652 02/14/20 1652 02/14/20 1652 02/14/20 1652   (!) 141/88 98.9 °F (37.2 °C) Oral 129 24 (!) 81 % 5' 6\" (1.676 m) 281 lb (127.5 kg)       Physical Exam  Vitals signs and nursing note reviewed. Constitutional:       Appearance: She is well-developed. Comments: She is alert does not look to be in distress but she is tachycardic and hypoxic on the monitor. HENT:      Head: Normocephalic and atraumatic. Right Ear: Tympanic membrane and external ear normal.      Left Ear: Tympanic membrane and external ear normal.      Nose: Nose normal.      Mouth/Throat:      Mouth: Mucous membranes are moist.      Pharynx: Oropharynx is clear. Eyes:      General: No scleral icterus. Conjunctiva/sclera: Conjunctivae normal.      Pupils: Pupils are equal, round, and reactive to light. Neck:      Musculoskeletal: Normal range of motion and neck supple. Cardiovascular:      Rate and Rhythm: Regular rhythm. Tachycardia present. Pulses: Normal pulses. Heart sounds: Normal heart sounds. No murmur. Pulmonary:      Effort: Pulmonary effort is normal.      Breath sounds: Wheezing and rhonchi present. Abdominal:      General: Bowel sounds are normal.      Palpations: Abdomen is soft. Tenderness: There is no abdominal tenderness. Musculoskeletal: Normal range of motion. Skin:     General: Skin is warm and dry. Coloration: Skin is not jaundiced or pale. Neurological:      General: No focal deficit present. Mental Status: She is alert and oriented to person, place, and time. Psychiatric:         Mood and Affect: Mood normal.         Behavior: Behavior normal.         DIAGNOSTIC RESULTS     EKG: All EKG's are interpreted by the Emergency Department Physician who either signs or Co-signs this chart in the absence of a cardiologist.    Sinus tachycardia rate 123    RADIOLOGY:   Non-plain film images such as CT, Ultrasound and MRI are read by the radiologist. Plainradiographic images are visualized and preliminarily interpreted by the emergency physician with the below findings:    I reviewed the images and results.     Interpretation per the Radiologist within normal limits   MICROSCOPIC URINALYSIS - Abnormal; Notable for the following components:    WBC, UA 21-30 (*)     RBC, UA 6-10 (*)     All other components within normal limits   RAPID INFLUENZA A/B ANTIGENS   CULTURE BLOOD #1   CULTURE BLOOD #2   URINE CULTURE   BRAIN NATRIURETIC PEPTIDE   POTASSIUM, WHOLE BLOOD    Narrative:     [address]  KatelynRITU Webb RN, 02/14/2020 17:02, by FULTA   TROPONIN   LACTIC ACID, PLASMA   APTT   TSH WITHOUT REFLEX   MAGNESIUM       All other labs were within normal range or not returned as of this dictation. EMERGENCY DEPARTMENT COURSE and DIFFERENTIALDIAGNOSIS/MDM:   Vitals:    Vitals:    02/14/20 1745 02/14/20 1746 02/14/20 1850 02/14/20 1902   BP: (!) 141/88  138/87 138/62   Pulse: 120  120 121   Resp: 21 18 20 19   Temp:       TempSrc:       SpO2: (!) 89% 93% (!) 89% (!) 88%   Weight:       Height:           MDM  Number of Diagnoses or Management Options  Acute respiratory failure with hypoxia ([address]):   Pneumonia of both lungs due to infectious organism, unspecified part of lung:   Diagnosis management comments: Work-up shows bilateral pneumonia. Patient does not wish to stay. We discussed about AMA papers. I instructed her that in good conscience I could not let her go on my own accord. I would meet her compromise with the discharge with outpatient antibiotics because of her hypoxia my fears that she would suffer an event. She has a 32or 19-year-old special needs child at home that she is very concerned about. Family is here and they be very supportive with home care patient agrees to spend the night and receive therapy. Hopefully 1 or 2 days will turn around at least prove her stability that she is not getting worse. CONSULTS:  IP CONSULT TO HOSPITALIST  I discussed the case with Dr. Becky Mijares:  Unless otherwise notedbelow, none     Procedures    FINAL IMPRESSION     1.  Pneumonia of both lungs due to infectious

## 2020-02-15 NOTE — CONSULTS
MG tablet Take 20 mg by mouth daily   Yes Historical Provider, MD   furosemide (LASIX) 40 MG tablet Take 40 mg by mouth daily   Yes Historical Provider, MD   aspirin 81 MG tablet Take 81 mg by mouth daily   Yes Historical Provider, MD   ALBUTEROL SULFATE HFA IN Inhale 90 mcg into the lungs as needed   Yes Historical Provider, MD       Allergies:    Sulfa antibiotics    Social History:  Smoking as above. No drugs. Social alcohol. Family History:  No lung disease    Review of Systems:   Review of Systems As above    Physical Examination:  Vitals: BP (!) 110/48   Pulse 102   Temp 97.1 °F (36.2 °C) (Temporal)   Resp 16   Ht 5' 6\" (1.676 m)   Wt 277 lb 3.2 oz (125.7 kg)   SpO2 93%   BMI 44.74 kg/m²     Gen: morbidly obese WW, NAD on 2LNC  HEENT:NCAT, EOMI  CV: RR, nl S1&S2, no MGR  Resp: globally diminished, diffuse wheezing, no conversational dyspnea  Neuro: Alert, fluent speech  Psych: Calm, pleasant, good historian    Diagnostic Data:  Imaging:   CTA PULMONARY W CONTRAST   Final Result   1. Patchy bilateral infiltrate compatible with pneumonia. 2. No pulmonary embolism. Signed by Dr Chelo Canchola on 2/14/2020 6:47 PM      XR CHEST PORTABLE   Final Result   1. Diffuse interstitial disease with basilar prominence. No focal   consolidation.    Signed by Dr Chelo Canchola on 2/14/2020 5:32 PM        ABGs:  Lab Results   Component Value Date    PHART 7.380 02/14/2020    PO2ART 45.0 02/14/2020    XDV1DNP 45.0 02/14/2020     CBC:  Recent Labs     02/14/20  1730 02/15/20  0126   WBC 16.7* 17.7*   HGB 14.3 13.1   HCT 47.8* 45.1    257     BMP:  Recent Labs     02/14/20  1656 02/14/20  1730 02/15/20  0126   NA  --  137 140   K 3.8 3.9 4.3   CL  --  97* 98   CO2  --  26 25   BUN  --  19 21   CREATININE  --  0.9 0.9   CALCIUM  --  9.0 8.7*     Recent Labs     02/14/20  1730   AST 15   ALT 18   BILITOT 0.5   ALKPHOS 80     Coag Panel:   Recent Labs     02/14/20  1730   INR 1.19*   PROTIME 14.5

## 2020-02-15 NOTE — PROGRESS NOTES
Ref Range: 7 - 19 mmol/L 17   GFR Non- Latest Ref Range: >60  >60   Glucose Latest Ref Range: 74 - 109 mg/dL 197 (H)   Calcium Latest Ref Range: 8.8 - 10.2 mg/dL 8.7 (L)   WBC Latest Ref Range: 4.8 - 10.8 K/uL 17.7 (H)   RBC Latest Ref Range: 4.20 - 5.40 M/uL 5.23   Hemoglobin Quant Latest Ref Range: 12.0 - 16.0 g/dL 13.1   Hematocrit Latest Ref Range: 37.0 - 47.0 % 45.1   MCV Latest Ref Range: 81.0 - 99.0 fL 86.2   MCH Latest Ref Range: 27.0 - 31.0 pg 25.0 (L)   MCHC Latest Ref Range: 33.0 - 37.0 g/dL 29.0 (L)   MPV Latest Ref Range: 9.4 - 12.3 fL 10.8   RDW Latest Ref Range: 11.5 - 14.5 % 18.9 (H)   Platelet Count Latest Ref Range: 130 - 400 K/uL 257   Results for Trevor Belcher (MRN [id number]) as of 2/15/2020 11:26   Ref. Range 2/14/2020 18:20   Color, UA Latest Ref Range: Straw/Yellow  ORANGE (A)   Clarity, UA Latest Ref Range: Clear  TURBID (A)   Glucose, UA Latest Ref Range: Negative mg/dL Negative   Bilirubin, Urine Latest Ref Range: Negative  SMALL (A)   Ketones, Urine Latest Ref Range: Negative mg/dL TRACE (A)   Specific [place], UA Latest Ref Range: 1.005 - 1.030  1.040   Blood, Urine Latest Ref Range: Negative  SMALL (A)   pH, UA Latest Ref Range: 5.0 - 8.0  5.5   Protein, UA Latest Ref Range: Negative mg/dL 100 (A)   Urobilinogen, Urine Latest Ref Range: <2.0 E.U./dL 1.0   Nitrite, Urine Latest Ref Range: Negative  Negative   Leukocyte Esterase, Urine Latest Ref Range: Negative  TRACE (A)   Urine Reflex to Culture Unknown YES   WBC, UA Latest Ref Range: 0 - 5 /HPF 21-30 (A)   RBC, UA Latest Ref Range: 0 - 2 /HPF 6-10 (A)   Epi Cells Latest Units: /HPF 10-20   Bacteria, UA Latest Units: /HPF 3+   Urine Culture, Routine Unknown No growth   URINE RT REFLEX TO CULTURE Unknown Rpt (A)   Results for Trevor Belcher (MRN [id number]) as of 2/15/2020 11:26   Ref.  Range 2/14/2020 17:20 2/14/2020 17:30 2/14/2020 17:48 2/14/2020 18:20 2/14/2020 21:40   CULTURE BLOOD #1 Unknown  Rpt      CULTURE BLOOD next now  protonix 40mg pO QAC needs to be started as on steroids  Rocephin 1g IV Q24h to be continued  Continue Oxygen via NC   Changed Q4h WA duoneb to 4x daily  Budesonide neb Q12h added  Albuterol nebs PRN added   Counseled for >=5 min for tobacco cessation    DISPOSITION Planning: anticipate ready on Monday for d/c, suspect will need home O2    DVT PPx: Lovenox SQ      Chronic medical problems:  Continue  Percocet 10-325mg PO Q8h PRN  Narcan PRN for safety  Continue trazadone 50mg PO QHS  Continue lisinopril 20mg po QDay  Continue lasix 40mg pO QDay  Continue ASA 81mg PO QDay      Care time ~40 minutes, hosp meds altered and home meds restarted  Counseled for >=5 min for tobacco cessation

## 2020-02-15 NOTE — H&P
[facility] Hospitalists      Hospitalist - History & Physical      PCP: Leif Milian MD    Date of Admission: 2/14/2020    Date of Service: 2/14/2020    Chief Complaint:  SOB, was found to have tachycardia, acute hypoxemic respiratory failure with pneumonia     History Of Present Illness: The patient is a 61 y.o. female with significant PMH of morbid obesity who does not wear any O2 at home , COPD, HTN, chronic low back pain degenerative disc disease  who presented with progressive SOB coughing was found to have wbc 16.7, dimer elevated, CTa was showing no PE but bilateral pneumonia, pulse ox was very low, PO2 on abg 45, was placed on 4 liters O2, , UA +ve   Past Medical History:    No past medical history on file. Past Surgical History:    No past surgical history on file. Home Medications:  Prior to Admission medications    Not on File       Allergies:    Sulfa antibiotics    Social History:      Tobacco:   has no history on file for tobacco.  Alcohol:   has no history on file for alcohol. Illicit Drugs: no     Family History:  No family history on file. Review of Systems:   Constitutional / general:  No fever / chills / sweats  HEENT: No sore throat / hoarseness / vision changes  CV:  No chest pain / palpitations/ orthopnea   GI: No nausea / vomiting / abdominal pain / diarrhea / constipation  :  No dysuria / hesitancy / urgency / hematuria   Neuro: No muscle weakness / dysphagia / headache / paresthesias  Musculoskeletal:  No edema / cyanosis / pain  Skin:  No new rashes / lesions    Physical Examination:       /62   Pulse 121   Temp 98.9 °F (37.2 °C) (Oral)   Resp 19   Ht 5' 6\" (1.676 m)   Wt 281 lb (127.5 kg)   SpO2 (!) 88%   BMI 45.35 kg/m²   General appearance:some apparent distress, appears stated age and cooperative. Well developed and well groomed. HEENT: Normal cephalic, atraumatic without obvious deformity. Pupils equal, round, and reactive to light.   Extra ocular muscles intact. Conjunctivae/corneas clear. Normal ears and nose. Neck: Supple, with full range of motion. No jugular venous distention. Trachea midline. Thyroid no masses noted. Respiratory: scattered wheezing and crackles   Cardiovascular: Regular rate and rhythm with normal S1/S2 without murmurs, rubs or gallops. Abdomen: Soft, non-tender, non-distended with normal bowel sounds. .  Musculoskeletal: No clubbing, cyanosis or edema bilaterally. Full range of motion without deformity in 4 extremities. Neurologic:  Neurovascularly intact without any focal sensory/motor deficits. Cranial nerves: II-XII intact, grossly non-focal.  Psychiatric: Alert and oriented, thought content appropriate, normal insight. Diagnostic Data:  Imaging:   CTA PULMONARY W CONTRAST   Final Result   1. Patchy bilateral infiltrate compatible with pneumonia. 2. No pulmonary embolism. Signed by Dr Sampson Been on 2/14/2020 6:47 PM      XR CHEST PORTABLE   Final Result   1. Diffuse interstitial disease with basilar prominence. No focal   consolidation.    Signed by Dr Sampson Been on 2/14/2020 5:32 PM        CBC:  Recent Labs     02/14/20  1730   WBC 16.7*   HGB 14.3   HCT 47.8*        BMP:  Recent Labs     02/14/20  1656 02/14/20  1730   NA  --  137   K 3.8 3.9   CL  --  97*   CO2  --  26   BUN  --  19   CREATININE  --  0.9   CALCIUM  --  9.0     Recent Labs     02/14/20  1730   AST 15   ALT 18   BILITOT 0.5   ALKPHOS 80     Coag Panel:   Recent Labs     02/14/20  1730   INR 1.19*   PROTIME 14.5   APTT 29.7     Cardiac Enzymes:   Recent Labs     02/14/20  1730   TROPONINI <0.01     ABGs:  Lab Results   Component Value Date    PHART 7.380 02/14/2020    PO2ART 45.0 02/14/2020    CUA2NLX 45.0 02/14/2020     Urinalysis:  Lab Results   Component Value Date    NITRU Negative 02/14/2020    WBCUA 21-30 02/14/2020    BACTERIA 3+ 02/14/2020    RBCUA 6-10 02/14/2020    BLOODU SMALL 02/14/2020    SPECGRAV 1.040 02/14/2020 GLUCOSEU Negative 02/14/2020       Active Hospital Problems    Diagnosis Date Noted    Acute on chronic respiratory failure with hypercapnia ([facility] [address] [J96.22] 02/14/2020       Assessment and Plan: Active Problems:    Acute on chronic respiratory failure with hypercapnia (HCC)  Resolved Problems:    * No resolved hospital problems.  *  admit, RT abx, BC flu was -ve  Sinus tachycardia 2 to pneumonia, check TSh, MG   Morbid obesity, ? SHERON   H/o HTN  DVT prophylaxis with Lovenox           Rayma Rosalba  Hospitalist service  2/14/2020  8:48 PM

## 2020-02-15 NOTE — PROGRESS NOTES
Karely BAKER Genet Ortega arrived to room # 143. Presented with: SoA  Mental Status: Patient is oriented, alert, coherent, logical, thought processes intact and able to concentrate and follow conversation. Vitals:    02/14/20 2237   BP:    Pulse:    Resp: 19   Temp:    SpO2: 93%     Patient safety contract and falls prevention contract reviewed with patient Yes. Oriented Patient and Family to room. Call light within reach. Yes.   Needs, issues or concerns expressed at this time: no.      Electronically signed by Abundio Horner RN on 2/14/2020 at 11:01 PM

## 2020-02-16 ENCOUNTER — OUTSIDE FACILITY SERVICE (OUTPATIENT)
Dept: PULMONOLOGY | Facility: CLINIC | Age: 60
End: 2020-02-16

## 2020-02-16 LAB
ANION GAP SERPL CALCULATED.3IONS-SCNC: 15 MMOL/L (ref 7–19)
BASOPHILS ABSOLUTE: 0 K/UL (ref 0–0.2)
BASOPHILS RELATIVE PERCENT: 0.2 % (ref 0–1)
BUN BLDV-MCNC: 51 MG/DL (ref 8–23)
CALCIUM SERPL-MCNC: 8.6 MG/DL (ref 8.8–10.2)
CHLORIDE BLD-SCNC: 100 MMOL/L (ref 98–111)
CO2: 24 MMOL/L (ref 22–29)
CREAT SERPL-MCNC: 1.3 MG/DL (ref 0.5–0.9)
EOSINOPHILS ABSOLUTE: 0 K/UL (ref 0–0.6)
EOSINOPHILS RELATIVE PERCENT: 0 % (ref 0–5)
GFR NON-AFRICAN AMERICAN: 42
GLUCOSE BLD-MCNC: 158 MG/DL (ref 74–109)
HCT VFR BLD CALC: 42.1 % (ref 37–47)
HEMOGLOBIN: 12.4 G/DL (ref 12–16)
IMMATURE GRANULOCYTES #: 0.3 K/UL
LYMPHOCYTES ABSOLUTE: 1.3 K/UL (ref 1.1–4.5)
LYMPHOCYTES RELATIVE PERCENT: 5.7 % (ref 20–40)
MCH RBC QN AUTO: 25.5 PG (ref 27–31)
MCHC RBC AUTO-ENTMCNC: 29.5 G/DL (ref 33–37)
MCV RBC AUTO: 86.4 FL (ref 81–99)
MONOCYTES ABSOLUTE: 1.1 K/UL (ref 0–0.9)
MONOCYTES RELATIVE PERCENT: 5 % (ref 0–10)
MRSA CULTURE ONLY: NORMAL
NEUTROPHILS ABSOLUTE: 19.9 K/UL (ref 1.5–7.5)
NEUTROPHILS RELATIVE PERCENT: 87.6 % (ref 50–65)
PDW BLD-RTO: 18.7 % (ref 11.5–14.5)
PLATELET # BLD: 276 K/UL (ref 130–400)
PMV BLD AUTO: 10.7 FL (ref 9.4–12.3)
POTASSIUM REFLEX MAGNESIUM: 4.7 MMOL/L (ref 3.5–5)
RBC # BLD: 4.87 M/UL (ref 4.2–5.4)
SODIUM BLD-SCNC: 139 MMOL/L (ref 136–145)
URINE CULTURE, ROUTINE: NORMAL
WBC # BLD: 22.7 K/UL (ref 4.8–10.8)

## 2020-02-16 PROCEDURE — 6360000002 HC RX W HCPCS: Performed by: HOSPITALIST

## 2020-02-16 PROCEDURE — 80048 BASIC METABOLIC PNL TOTAL CA: CPT

## 2020-02-16 PROCEDURE — 6370000000 HC RX 637 (ALT 250 FOR IP): Performed by: INTERNAL MEDICINE

## 2020-02-16 PROCEDURE — 2580000003 HC RX 258: Performed by: EMERGENCY MEDICINE

## 2020-02-16 PROCEDURE — 6360000002 HC RX W HCPCS: Performed by: EMERGENCY MEDICINE

## 2020-02-16 PROCEDURE — 2580000003 HC RX 258: Performed by: HOSPITALIST

## 2020-02-16 PROCEDURE — 99232 SBSQ HOSP IP/OBS MODERATE 35: CPT | Performed by: INTERNAL MEDICINE

## 2020-02-16 PROCEDURE — 85025 COMPLETE CBC W/AUTO DIFF WBC: CPT

## 2020-02-16 PROCEDURE — 94761 N-INVAS EAR/PLS OXIMETRY MLT: CPT

## 2020-02-16 PROCEDURE — 2700000000 HC OXYGEN THERAPY PER DAY

## 2020-02-16 PROCEDURE — 6370000000 HC RX 637 (ALT 250 FOR IP): Performed by: HOSPITALIST

## 2020-02-16 PROCEDURE — 1210000000 HC MED SURG R&B

## 2020-02-16 PROCEDURE — 36415 COLL VENOUS BLD VENIPUNCTURE: CPT

## 2020-02-16 PROCEDURE — 94640 AIRWAY INHALATION TREATMENT: CPT

## 2020-02-16 RX ORDER — PREDNISONE 20 MG/1
40 TABLET ORAL DAILY
Status: DISCONTINUED | OUTPATIENT
Start: 2020-02-16 | End: 2020-02-17 | Stop reason: HOSPADM

## 2020-02-16 RX ADMIN — FUROSEMIDE 40 MG: 40 TABLET ORAL at 08:15

## 2020-02-16 RX ADMIN — IPRATROPIUM BROMIDE AND ALBUTEROL SULFATE 1 AMPULE: .5; 3 SOLUTION RESPIRATORY (INHALATION) at 07:27

## 2020-02-16 RX ADMIN — BUDESONIDE 500 MCG: 0.5 INHALANT RESPIRATORY (INHALATION) at 18:55

## 2020-02-16 RX ADMIN — ASPIRIN 81 MG: 81 TABLET, COATED ORAL at 08:15

## 2020-02-16 RX ADMIN — OXYCODONE HYDROCHLORIDE AND ACETAMINOPHEN 1 TABLET: 10; 325 TABLET ORAL at 21:45

## 2020-02-16 RX ADMIN — ENOXAPARIN SODIUM 40 MG: 40 INJECTION SUBCUTANEOUS at 08:14

## 2020-02-16 RX ADMIN — PREDNISONE 40 MG: 20 TABLET ORAL at 17:53

## 2020-02-16 RX ADMIN — SODIUM CHLORIDE, PRESERVATIVE FREE 10 ML: 5 INJECTION INTRAVENOUS at 08:15

## 2020-02-16 RX ADMIN — AZITHROMYCIN 500 MG: 500 INJECTION, POWDER, LYOPHILIZED, FOR SOLUTION INTRAVENOUS at 21:33

## 2020-02-16 RX ADMIN — PANTOPRAZOLE SODIUM 40 MG: 40 TABLET, DELAYED RELEASE ORAL at 06:18

## 2020-02-16 RX ADMIN — SODIUM CHLORIDE, PRESERVATIVE FREE 1 G: 5 INJECTION INTRAVENOUS at 21:32

## 2020-02-16 RX ADMIN — IPRATROPIUM BROMIDE AND ALBUTEROL SULFATE 1 AMPULE: .5; 3 SOLUTION RESPIRATORY (INHALATION) at 11:44

## 2020-02-16 RX ADMIN — VANCOMYCIN HYDROCHLORIDE 1750 MG: 10 INJECTION, POWDER, LYOPHILIZED, FOR SOLUTION INTRAVENOUS at 17:53

## 2020-02-16 RX ADMIN — BUDESONIDE 500 MCG: 0.5 INHALANT RESPIRATORY (INHALATION) at 07:27

## 2020-02-16 RX ADMIN — IPRATROPIUM BROMIDE AND ALBUTEROL SULFATE 1 AMPULE: .5; 3 SOLUTION RESPIRATORY (INHALATION) at 15:15

## 2020-02-16 RX ADMIN — TRAZODONE HYDROCHLORIDE 50 MG: 50 TABLET ORAL at 21:32

## 2020-02-16 RX ADMIN — IPRATROPIUM BROMIDE AND ALBUTEROL SULFATE 1 AMPULE: .5; 3 SOLUTION RESPIRATORY (INHALATION) at 18:55

## 2020-02-16 RX ADMIN — METHYLPREDNISOLONE SODIUM SUCCINATE 40 MG: 40 INJECTION, POWDER, FOR SOLUTION INTRAMUSCULAR; INTRAVENOUS at 08:15

## 2020-02-16 ASSESSMENT — PAIN - FUNCTIONAL ASSESSMENT: PAIN_FUNCTIONAL_ASSESSMENT: ACTIVITIES ARE NOT PREVENTED

## 2020-02-16 ASSESSMENT — PAIN DESCRIPTION - DESCRIPTORS: DESCRIPTORS: ACHING

## 2020-02-16 ASSESSMENT — PAIN DESCRIPTION - PROGRESSION: CLINICAL_PROGRESSION: NOT CHANGED

## 2020-02-16 ASSESSMENT — PAIN DESCRIPTION - LOCATION: LOCATION: BACK

## 2020-02-16 ASSESSMENT — PAIN SCALES - GENERAL: PAINLEVEL_OUTOF10: 7

## 2020-02-16 ASSESSMENT — PAIN DESCRIPTION - FREQUENCY: FREQUENCY: CONTINUOUS

## 2020-02-16 ASSESSMENT — PAIN DESCRIPTION - ONSET: ONSET: ON-GOING

## 2020-02-16 ASSESSMENT — PAIN DESCRIPTION - ORIENTATION: ORIENTATION: LOWER

## 2020-02-16 ASSESSMENT — PAIN DESCRIPTION - PAIN TYPE: TYPE: CHRONIC PAIN

## 2020-02-16 NOTE — PROGRESS NOTES
Karely Leger Nilay received from ICU to room # 315 . Mental Status: Patient is oriented and alert. Vitals:    02/15/20 1912   BP: (!) 97/59   Pulse: 102   Resp: 18   Temp: 97.4 °F (36.3 °C)   SpO2: 91%     Placed on cardiac monitor: No.  Belongings: None with patient at bedside . Family at bedside Yes. Oriented Patient to room. Call light within reach. Yes. Transfer was: Well tolerated by patient. Vimal Newsome  [facility]. Complains of chronic back and hip pain.     Electronically signed by Oleg Conn RN on 2/15/2020 at 7:39 PM

## 2020-02-16 NOTE — PROGRESS NOTES
patient size. DLP in mGycm= 720. CT angiography protocol. CT imaging with bolus IV contrast injection. Under concurrent supervision axial, sagittal, coronal, and three-dimensional data sets were constructed. Comparison is made with a portable chest x-ray from 4:58 PM. Heart size is within normal limits. There is no thoracic aortic aneurysm or dissection. Symmetric well opacified pulmonary arteries. No pulmonary embolism. Lungs are fully expanded. There is patchy bilateral infiltrate which is greatest in the lower lobes. A small amount of infiltrate is noted within the right upper lobe. There is no pleural effusion or pneumothorax. 1. Patchy bilateral infiltrate compatible with pneumonia. 2. No pulmonary embolism.  Signed by Dr Lakeshia Nesbitt on 2/14/2020 6:47 PM      Don Nguyễn MD  2/16/2020 2:42 PM

## 2020-02-16 NOTE — PROGRESS NOTES
Pulse ox on 3 lpm at rest 92%  Pulse ox on room air at rest x 10 minutes 81%  Pulse ox on 3 lpm walking around in room 92%  Patient did not want to walk in [place]

## 2020-02-17 ENCOUNTER — OUTSIDE FACILITY SERVICE (OUTPATIENT)
Dept: PULMONOLOGY | Facility: CLINIC | Age: 60
End: 2020-02-17

## 2020-02-17 VITALS
DIASTOLIC BLOOD PRESSURE: 54 MMHG | RESPIRATION RATE: 18 BRPM | TEMPERATURE: 97.1 F | OXYGEN SATURATION: 95 % | HEART RATE: 90 BPM | SYSTOLIC BLOOD PRESSURE: 100 MMHG | BODY MASS INDEX: 44.55 KG/M2 | HEIGHT: 66 IN | WEIGHT: 277.2 LBS

## 2020-02-17 LAB
ANION GAP SERPL CALCULATED.3IONS-SCNC: 12 MMOL/L (ref 7–19)
BASOPHILS ABSOLUTE: 0 K/UL (ref 0–0.2)
BASOPHILS RELATIVE PERCENT: 0.2 % (ref 0–1)
BUN BLDV-MCNC: 57 MG/DL (ref 8–23)
CALCIUM SERPL-MCNC: 8.4 MG/DL (ref 8.8–10.2)
CHLORIDE BLD-SCNC: 101 MMOL/L (ref 98–111)
CO2: 27 MMOL/L (ref 22–29)
CREAT SERPL-MCNC: 1 MG/DL (ref 0.5–0.9)
CULTURE, BLOOD 2: ABNORMAL
CULTURE, BLOOD 2: ABNORMAL
EOSINOPHILS ABSOLUTE: 0 K/UL (ref 0–0.6)
EOSINOPHILS RELATIVE PERCENT: 0 % (ref 0–5)
GFR NON-AFRICAN AMERICAN: 57
GLUCOSE BLD-MCNC: 284 MG/DL (ref 74–109)
HCT VFR BLD CALC: 41.5 % (ref 37–47)
HEMOGLOBIN: 12.3 G/DL (ref 12–16)
IMMATURE GRANULOCYTES #: 0.6 K/UL
LYMPHOCYTES ABSOLUTE: 1.4 K/UL (ref 1.1–4.5)
LYMPHOCYTES RELATIVE PERCENT: 7.2 % (ref 20–40)
MCH RBC QN AUTO: 25.1 PG (ref 27–31)
MCHC RBC AUTO-ENTMCNC: 29.6 G/DL (ref 33–37)
MCV RBC AUTO: 84.7 FL (ref 81–99)
MONOCYTES ABSOLUTE: 1 K/UL (ref 0–0.9)
MONOCYTES RELATIVE PERCENT: 5.2 % (ref 0–10)
NEUTROPHILS ABSOLUTE: 15.6 K/UL (ref 1.5–7.5)
NEUTROPHILS RELATIVE PERCENT: 84 % (ref 50–65)
ORGANISM: ABNORMAL
PDW BLD-RTO: 18.3 % (ref 11.5–14.5)
PLATELET # BLD: 308 K/UL (ref 130–400)
PMV BLD AUTO: 10.8 FL (ref 9.4–12.3)
POTASSIUM REFLEX MAGNESIUM: 4.5 MMOL/L (ref 3.5–5)
RBC # BLD: 4.9 M/UL (ref 4.2–5.4)
SODIUM BLD-SCNC: 140 MMOL/L (ref 136–145)
WBC # BLD: 18.6 K/UL (ref 4.8–10.8)

## 2020-02-17 PROCEDURE — 6360000002 HC RX W HCPCS: Performed by: HOSPITALIST

## 2020-02-17 PROCEDURE — 2700000000 HC OXYGEN THERAPY PER DAY

## 2020-02-17 PROCEDURE — 85025 COMPLETE CBC W/AUTO DIFF WBC: CPT

## 2020-02-17 PROCEDURE — 80048 BASIC METABOLIC PNL TOTAL CA: CPT

## 2020-02-17 PROCEDURE — 36415 COLL VENOUS BLD VENIPUNCTURE: CPT

## 2020-02-17 PROCEDURE — 6370000000 HC RX 637 (ALT 250 FOR IP): Performed by: HOSPITALIST

## 2020-02-17 PROCEDURE — 94640 AIRWAY INHALATION TREATMENT: CPT

## 2020-02-17 PROCEDURE — 99232 SBSQ HOSP IP/OBS MODERATE 35: CPT | Performed by: INTERNAL MEDICINE

## 2020-02-17 PROCEDURE — 6370000000 HC RX 637 (ALT 250 FOR IP): Performed by: INTERNAL MEDICINE

## 2020-02-17 PROCEDURE — 94669 MECHANICAL CHEST WALL OSCILL: CPT

## 2020-02-17 PROCEDURE — 2580000003 HC RX 258: Performed by: HOSPITALIST

## 2020-02-17 RX ORDER — PREDNISONE 20 MG/1
40 TABLET ORAL DAILY
Qty: 4 TABLET | Refills: 0 | Status: SHIPPED | OUTPATIENT
Start: 2020-02-18 | End: 2020-02-17

## 2020-02-17 RX ORDER — CEFPODOXIME PROXETIL 100 MG/1
100 TABLET, FILM COATED ORAL 2 TIMES DAILY
Qty: 5 TABLET | Refills: 0 | Status: SHIPPED | OUTPATIENT
Start: 2020-02-17 | End: 2020-02-20

## 2020-02-17 RX ORDER — AZITHROMYCIN 250 MG/1
250 TABLET, FILM COATED ORAL SEE ADMIN INSTRUCTIONS
Qty: 2 TABLET | Refills: 0 | Status: SHIPPED | OUTPATIENT
Start: 2020-02-17

## 2020-02-17 RX ORDER — PANTOPRAZOLE SODIUM 40 MG/1
40 TABLET, DELAYED RELEASE ORAL
Qty: 30 TABLET | Refills: 3 | Status: SHIPPED | OUTPATIENT
Start: 2020-02-18 | End: 2020-02-17

## 2020-02-17 RX ORDER — CEFPODOXIME PROXETIL 100 MG/1
100 TABLET, FILM COATED ORAL 2 TIMES DAILY
Qty: 5 TABLET | Refills: 0 | Status: SHIPPED | OUTPATIENT
Start: 2020-02-17 | End: 2020-02-17 | Stop reason: SDUPTHER

## 2020-02-17 RX ORDER — AZITHROMYCIN 250 MG/1
250 TABLET, FILM COATED ORAL SEE ADMIN INSTRUCTIONS
Qty: 2 TABLET | Refills: 0 | Status: SHIPPED | OUTPATIENT
Start: 2020-02-17 | End: 2020-02-17 | Stop reason: SDUPTHER

## 2020-02-17 RX ORDER — PANTOPRAZOLE SODIUM 40 MG/1
40 TABLET, DELAYED RELEASE ORAL
Qty: 2 TABLET | Refills: 0 | Status: SHIPPED | OUTPATIENT
Start: 2020-02-18 | End: 2020-02-20

## 2020-02-17 RX ORDER — PREDNISONE 20 MG/1
40 TABLET ORAL DAILY
Qty: 4 TABLET | Refills: 0 | Status: SHIPPED | OUTPATIENT
Start: 2020-02-18 | End: 2020-02-20

## 2020-02-17 RX ADMIN — ENOXAPARIN SODIUM 40 MG: 40 INJECTION SUBCUTANEOUS at 09:56

## 2020-02-17 RX ADMIN — IPRATROPIUM BROMIDE AND ALBUTEROL SULFATE 1 AMPULE: .5; 3 SOLUTION RESPIRATORY (INHALATION) at 11:10

## 2020-02-17 RX ADMIN — FUROSEMIDE 40 MG: 40 TABLET ORAL at 09:57

## 2020-02-17 RX ADMIN — ASPIRIN 81 MG: 81 TABLET, COATED ORAL at 09:57

## 2020-02-17 RX ADMIN — SODIUM CHLORIDE: 9 INJECTION, SOLUTION INTRAVENOUS at 10:18

## 2020-02-17 RX ADMIN — IPRATROPIUM BROMIDE AND ALBUTEROL SULFATE 1 AMPULE: .5; 3 SOLUTION RESPIRATORY (INHALATION) at 15:25

## 2020-02-17 RX ADMIN — BUDESONIDE 500 MCG: 0.5 INHALANT RESPIRATORY (INHALATION) at 07:18

## 2020-02-17 RX ADMIN — LISINOPRIL 20 MG: 20 TABLET ORAL at 09:57

## 2020-02-17 RX ADMIN — PREDNISONE 40 MG: 20 TABLET ORAL at 09:57

## 2020-02-17 RX ADMIN — SODIUM CHLORIDE, PRESERVATIVE FREE 10 ML: 5 INJECTION INTRAVENOUS at 10:18

## 2020-02-17 RX ADMIN — IPRATROPIUM BROMIDE AND ALBUTEROL SULFATE 1 AMPULE: .5; 3 SOLUTION RESPIRATORY (INHALATION) at 07:18

## 2020-02-17 RX ADMIN — PANTOPRAZOLE SODIUM 40 MG: 40 TABLET, DELAYED RELEASE ORAL at 05:53

## 2020-02-17 RX ADMIN — OXYCODONE HYDROCHLORIDE AND ACETAMINOPHEN 1 TABLET: 10; 325 TABLET ORAL at 10:01

## 2020-02-17 ASSESSMENT — ENCOUNTER SYMPTOMS
SHORTNESS OF BREATH: 1
COUGH: 0
DIARRHEA: 0
NAUSEA: 0
VOMITING: 0

## 2020-02-17 ASSESSMENT — PAIN SCALES - GENERAL
PAINLEVEL_OUTOF10: 0
PAINLEVEL_OUTOF10: 8

## 2020-02-17 NOTE — PROGRESS NOTES
Pulmonary and Critical Care Progress Note [address] [facility]    Patient: Tanya Edgar  1960   MR# [id number]   Acct# [de-identified]  02/17/20   11:39 AM  Referring Provider: Sandy Delarosa MD    Chief Complaint: COPD exacerbation   Interval history: The patient is sitting up in chair at bedside on 4L NC. He son is in the hospital bed. She is anxious to go home today. She does not use home O2. She does have a home neb machine. Is in interested in starting chantix to aide in smoking cessation. Medications:   predniSONE, 40 mg, Oral, Daily    vancomycin, 1,750 mg, Intravenous, Q24H    vancomycin (VANCOCIN) intermittent dosing (placeholder), , Other, RX Placeholder    furosemide, 40 mg, Oral, Daily    aspirin, 81 mg, Oral, Daily    lisinopril, 20 mg, Oral, Daily    traZODone, 50 mg, Oral, Nightly    ipratropium-albuterol, 1 ampule, Inhalation, 4x daily    azithromycin, 500 mg, Intravenous, Q24H    budesonide, 0.5 mg, Nebulization, Q12H    pantoprazole, 40 mg, Oral, QAM AC    sodium chloride flush, 10 mL, Intravenous, 2 times per day    enoxaparin, 40 mg, Subcutaneous, Daily    cefTRIAXone (ROCEPHIN) IV, 1 g, Intravenous, Q24H   Review of Systems: Review of Systems   Constitutional: Negative for chills and fever. Respiratory: Positive for shortness of breath. Negative for cough. Cardiovascular: Negative for chest pain. Gastrointestinal: Negative for diarrhea, nausea and vomiting. Physical Exam:  Blood pressure (!) 100/54, pulse 90, temperature 97.1 °F (36.2 °C), resp. rate 16, height 5' 6\" (1.676 m), weight 277 lb 3.2 oz (125.7 kg), SpO2 92 %. Intake/Output Summary (Last 24 hours) at 2/17/2020 1139  Last data filed at 2/17/2020 1017  Gross per 24 hour   Intake 480 ml   Output 400 ml   Net 80 ml     Physical Exam  Constitutional:       General: She is not in acute distress. Appearance: She is well-developed. Interventions: Nasal cannula in place.       Comments: Morbid obesity    HENT:      Head: Normocephalic and atraumatic. Eyes:      General:         Right eye: No discharge. Conjunctiva/sclera: Conjunctivae normal.      Pupils: Pupils are equal, round, and reactive to light. Neck:      Musculoskeletal: Normal range of motion and neck supple. Cardiovascular:      Rate and Rhythm: Normal rate and regular rhythm. Heart sounds: Normal heart sounds. Pulmonary:      Effort: Pulmonary effort is normal. No respiratory distress. Breath sounds: Normal breath sounds. Decreased air movement present. Abdominal:      General: Bowel sounds are normal.      Palpations: Abdomen is soft. Musculoskeletal: Normal range of motion. General: Swelling present. Skin:     General: Skin is warm and dry. Neurological:      Mental Status: She is alert and oriented to person, place, and time.    Psychiatric:         Behavior: Behavior normal.       Recent Labs     02/15/20  0126 02/16/20  0414 02/17/20  0948   WBC 17.7* 22.7* 18.6*   RBC 5.23 4.87 4.90   HGB 13.1 12.4 12.3   HCT 45.1 42.1 41.5    276 308   MCV 86.2 86.4 84.7   MCH 25.0* 25.5* 25.1*   MCHC 29.0* 29.5* 29.6*   RDW 18.9* 18.7* 18.3*      Recent Labs     02/14/20  1656  02/14/20  1730 02/14/20  2147 02/15/20  0126 02/16/20  0414 02/17/20  0948   NA  --    < > 137  --  140 139 140   K 3.8  --  3.9  --  4.3 4.7 4.5   CL  --    < > 97*  --  98 100 101   CO2  --    < > 26  --  25 24 27   BUN  --    < > 19  --  21 51* 57*   CREATININE  --    < > 0.9  --  0.9 1.3* 1.0*   CALCIUM  --    < > 9.0  --  8.7* 8.6* 8.4*   GLUCOSE  --    < > 155*  --  197* 158* 284*   PHART 7.380  --   --   --   --   --   --    MAZ8LMT 45.0  --   --   --   --   --   --    PO2ART 45.0*  --   --   --   --   --   --    AMD8ODA 26.6*  --   --   --   --   --   --    U1YOCZCO 80.7*  --   --   --   --   --   --    BEART 1.0  --   --   --   --   --   --    AST  --   --  15  --   --   --   --    ALT  --   --  18  --   --   --   -- ALKPHOS  --   --  80  --   --   --   --    BILITOT  --   --  0.5  --   --   --   --    MG  --   --  2.1  --   --   --   --    TROPONINI  --   --  <0.01  --   --   --   --    LACTA  --    < > 1.5 1.8  --   --   --    INR  --   --  1.19*  --   --   --   --    TSH  --   --  2.990  --   --   --   --     < > = values in this interval not displayed. Recent Labs     02/14/20  1720   BC No Growth to date. Any change in status will be called. Radiograph: none to review today   My radiograph interpretation:   Pulmonary Assessment:    1. Acute respiratory failure with hypoxemia   2. Probable COPD exacerbation  3. Tobacco abuse  4. Chronic pain    Recommend:   · Titrate O2 for sat 90-94%. She is currently on 4L. She does not use O2 at baseline. · ? SHERON/OHS - recommend ON then possibly sleep study  · Smoking cessation  · will need [Advair or symbicort or Breo] and [Spiriva or Tudorza or Incruse] at discharge. Could also send with Trelegy monotherapy if insurance will approve  -will need to follow up with a pulmonologist and have PFTs to confirm or disprove the diagnosis of COPD  · Outpatient PFTs  · Defer chantix to patient's PCP  · Patient's PCP is in [place], [place] - consider following up with pulmonary in [place] if that is closer for her - Dr. Dennis Mendez, otherwise happy to follow her up outpatient in 4-6 weeks after discharge with CPFT p/p  Electronically signed by Nicole Sheikh on 2/17/2020 at 11:39 AM  Physician's substantive portion:  Subjective: The patient currently is breathing comfortably on oxygen via nasal cannula. She appears in no distress. Objective: She is a morbidly obese white female. Lung fields are clear with diminished breath sounds. Assessment/recommendation: Did inquire about a possible sleep apnea symptoms and she states she does snore intermittently but not routinely.   I do think she would be at high risk for sleep apnea and would recommend an outpatient sleep study which can be arranged for her primary care physician. Otherwise pulmonary will sign off and she can see us in the office as needed. I have seen and examined patient personally, performing a face-to-face diagnostic evaluation with plan of care reviewed and developed with APRN. I have addended and/or modified the above history of present illness, physical examination, and assessment and plan to reflect my findings and impressions. Essential elements of the care plan were discussed with APRN above. Agree with findings and assessment/plan as documented above.     Electronically signed by Ravi Guadarrama MD on 2/17/20 at 12:25 PM

## 2020-02-17 NOTE — PROGRESS NOTES
Subjective:   Critical Care Daily Progress Note: 2/16/2020 10:15 PM    Interval History:   [id number]:  Mrs. Pamela Wong is a pleasant 61year old lady from home. She wants to go home. She has had: increased night sweats, dyspnea on exertion ONLY. She has not had: fever, chills, weak, fatigued, malaise, chest pain, confusion, near syncope, nausea, diarrhea, constipation, and dysuria. Home o2 received today. Pulmonary medicine consult appreciated. [id number]:  Mrs. Maryan Norwood a pleasant 61year old  american lady from [facility]. She has never seen a lung doctor. She was never hospitalized before for her lungs. She has no home oxygen, but had it in the past. She quit smoking 7-8 days ago, completely. She has smoked since age 15, at an avrg of 1.5 PPD for 72 pack-years. She also endorses: fevers for 4 days straight when this started but none in the last 3 days, weakness, dyspnea on exertion, and diaphoresis. She has chronic unchanged: bed drenching night sweats, and sleeping in chair x years. She also denies: chills, fatigue, malaise, chest pain, confusion, nausea, and near syncope.    [id number]: (copied from Dr. Reed Brown note)  \"The patient is a 57 y.o. female with significant PMH of morbid obesity who does not wear any O2 at home , COPD, HTN, chronic low back pain degenerative disc disease  who presented with progressive SOB coughing was found to have wbc 16.7, dimer elevated, CTa was showing no PE but bilateral pneumonia, pulse ox was very low, PO2 on abg 45, was placed on 4 liters O2, , UA +ve\"    Scheduled Meds:   predniSONE  40 mg Oral Daily    vancomycin  1,750 mg Intravenous Q24H    vancomycin (VANCOCIN) intermittent dosing (placeholder)   Other RX Placeholder    furosemide  40 mg Oral Daily    aspirin  81 mg Oral Daily    lisinopril  20 mg Oral Daily    traZODone  50 mg Oral Nightly    ipratropium-albuterol  1 ampule Inhalation 4x daily    azithromycin  500 mg Intravenous Q24H    budesonide 0.5 mg Nebulization Q12H    pantoprazole  40 mg Oral QAM AC    sodium chloride flush  10 mL Intravenous 2 times per day    enoxaparin  40 mg Subcutaneous Daily    cefTRIAXone (ROCEPHIN) IV  1 g Intravenous Q24H     Continuous Infusions:   sodium chloride 75 mL/hr at 02/15/20 1228     PRN Meds:oxyCODONE-acetaminophen, albuterol, naloxone, sodium chloride flush, magnesium hydroxide, ondansetron, potassium chloride **OR** potassium alternative oral replacement **OR** potassium chloride, magnesium sulfate, acetaminophen    Review of Systems  Pertinent items are noted in HPI. Objective:     No intake/output data recorded. No intake/output data recorded. BP (!) 114/59   Pulse 104   Temp 97 °F (36.1 °C) (Temporal)   Resp 16   Ht 5' 6\" (1.676 m)   Wt 277 lb 3.2 oz (125.7 kg)   SpO2 95%   BMI 44.74 kg/m²     Physical Exam  Vitals signs reviewed. Constitutional:       General: She is not in acute distress. Appearance: She is obese. She is not ill-appearing or toxic-appearing. HENT:      Head: Normocephalic and atraumatic. Nose: No congestion or rhinorrhea. Eyes:      General:         Right eye: No discharge. Left eye: No discharge. Neck:      Musculoskeletal: Neck supple. Comments: Trachea appears midline  Cardiovascular:      Rate and Rhythm: Normal rate and regular rhythm. Heart sounds: No murmur. No friction rub. No gallop. Pulmonary:      Effort: No respiratory distress. Breath sounds: No stridor. Wheezing present. No rhonchi or rales. Abdominal:      Tenderness: There is no abdominal tenderness. There is no guarding or rebound. Musculoskeletal:      Comments: Increase fat stores, has abd tendency for obesity, no wasting of muscle stores   Skin:     General: Skin is warm. Comments: nondiaphoretic   Neurological:      Mental Status: She is alert and oriented to person, place, and time.    Psychiatric:         Mood and Affect: Mood normal. Behavior: Behavior normal.       LABS:  Results for Jacqueline Torres (MRN [id number]) as of 2/16/2020 22:17   Ref. Range 2/14/2020 17:30 2/14/2020 21:47 2/15/2020 01:26 2/16/2020 04:14   Sodium Latest Ref Range: 136 - 145 mmol/L 137  140 139   Potassium Latest Ref Range: 3.5 - 5.0 mmol/L 3.9  4.3 4.7   Chloride Latest Ref Range: 98 - 111 mmol/L 97 (L)  98 100   CO2 Latest Ref Range: 22 - 29 mmol/L 26  25 24   BUN Latest Ref Range: 8 - 23 mg/dL 19  21 51 (H)   Creatinine Latest Ref Range: 0.5 - 0.9 mg/dL 0.9  0.9 1.3 (H)   Anion Gap Latest Ref Range: 7 - 19 mmol/L 14  17 15   GFR Non- Latest Ref Range: >60  >60  >60 42 (A)   Magnesium Latest Ref Range: 1.6 - 2.4 mg/dL 2.1      Lactic Acid Latest Ref Range: 0.5 - 1.9 mmol/L 1.5 1.8     Glucose Latest Ref Range: 74 - 109 mg/dL 155 (H)  197 (H) 158 (H)   Calcium Latest Ref Range: 8.8 - 10.2 mg/dL 9.0  8.7 (L) 8.6 (L)   Total Protein Latest Ref Range: 6.6 - 8.7 g/dL 7.1      Pro-BNP Latest Ref Range: 0 - 900 pg/mL 181      Troponin Latest Ref Range: 0.00 - 0.03 ng/mL <0.01      Albumin Latest Ref Range: 3.5 - 5.2 g/dL 3.3 (L)      Alk Phos Latest Ref Range: 35 - 104 U/L 80      ALT Latest Ref Range: 5 - 33 U/L 18      AST Latest Ref Range: 5 - 32 U/L 15      Bilirubin Latest Ref Range: 0.2 - 1.2 mg/dL 0.5      TSH Latest Ref Range: 0.270 - 4.200 uIU/mL 2.990      Results for Jacqueline Torres (MRN [id number]) as of 2/16/2020 22:17   Ref.  Range 2/14/2020 17:30 2/15/2020 01:26 2/16/2020 04:14   WBC Latest Ref Range: 4.8 - 10.8 K/uL 16.7 (H) 17.7 (H) 22.7 (H)   RBC Latest Ref Range: 4.20 - 5.40 M/uL 5.73 (H) 5.23 4.87   Hemoglobin Quant Latest Ref Range: 12.0 - 16.0 g/dL 14.3 13.1 12.4   Hematocrit Latest Ref Range: 37.0 - 47.0 % 47.8 (H) 45.1 42.1   MCV Latest Ref Range: 81.0 - 99.0 fL 83.4 86.2 86.4   MCH Latest Ref Range: 27.0 - 31.0 pg 25.0 (L) 25.0 (L) 25.5 (L)   MCHC Latest Ref Range: 33.0 - 37.0 g/dL 29.9 (L) 29.0 (L) 29.5 (L)   MPV Latest Ref Range: 9.4 - 12.3 fL 10.8 10.8 10.7   RDW Latest Ref Range: 11.5 - 14.5 % 19.6 (H) 18.9 (H) 18.7 (H)   Platelet Count Latest Ref Range: 130 - 400 K/uL 244 257 276   Neutrophils % Latest Ref Range: 50.0 - 65.0 % 84.4 (H)  87.6 (H)   Lymphocyte % Latest Ref Range: 20.0 - 40.0 % 6.8 (L)  5.7 (L)   Monocytes % Latest Ref Range: 0.0 - 10.0 % 7.6  5.0   Eosinophils % Latest Ref Range: 0.0 - 5.0 % 0.4  0.0   Basophils % Latest Ref Range: 0.0 - 1.0 % 0.4  0.2   Neutrophils Absolute Latest Ref Range: 1.5 - 7.5 K/uL 14.1 (H)  19.9 (H)   Immature Granulocytes # Latest Units: K/uL 0.1  0.3   Lymphocytes Absolute Latest Ref Range: 1.1 - 4.5 K/uL 1.1  1.3   Monocytes Absolute Latest Ref Range: 0.00 - 0.90 K/uL 1.30 (H)  1.10 (H)   Eosinophils Absolute Latest Ref Range: 0.00 - 0.60 K/uL 0.10  0.00   Basophils Absolute Latest Ref Range: 0.00 - 0.20 K/uL 0.10  0.00         Assessment:     Active Problems:    Acute on chronic respiratory failure with hypercapnia (HCC)  Resolved Problems:    * No resolved hospital problems.  *        Plan:     Acute Hypoxemic Respiratory Failure possible due to a Questionable COPD Exacerbation with definite underlying Bilateral CAP:  Tobacco Abuse:  Continue azithromycin, got 500 already, needs 4 more dialy doses of 250mg to complete 5 say course  Solumedrol 125 last night, needs 40mg daily - next now  protonix 40mg pO QAC needs to be started as on steroids  Rocephin 1g IV Q24h to be continued  Continue Oxygen via NC   Changed Q4h WA duoneb to 4x daily  Budesonide neb Q12h added  Albuterol nebs PRN added   Counseled for >=5 min for tobacco cessation by me on 15th, no tobacco in 7 days     DISPOSITION Planning: anticipate ready on Monday for d/c, home O2, SCOTT developed? on IVF and for recheck labs in AM     DVT PPx: Lovenox SQ        Chronic medical problems:  Continue  Percocet 10-325mg PO Q8h PRN  Narcan PRN for safety  Continue trazadone 50mg PO QHS  Continue lisinopril 20mg po QDay  Continue lasix 40mg pO

## 2020-02-17 NOTE — DISCHARGE SUMMARY
Physician Discharge Summary     Patient ID:  Hoda Edward  [id number]  61 y.o.  1960    Admit date: 2/14/2020    Discharge date and time: 17FEB20    Admitting Physician: Eleni Celeste MD    Discharge Physician: Parvez Ferreira MD    Admission Diagnoses: Acute on chronic respiratory failure with hypercapnia [facility]) [J96.22]    Discharge Diagnoses: Acute on chronic respiratory failure with hypercapnia ([address] [J96.22]    Admission Condition: critical    Discharged Condition: fair    Indication for Admission: Acute on chronic respiratory failure with hypercapnia [facility] [place] Course:   [id number]: SHORTLY Pre-Noon  Her SCOTT has resolved, her blood Cxx are negative for coagulase + cocci  [id number]: early AM  Mrs. Olga Edward is a pleasant 61year old lady from home. She has been feeling better and states that she is ready to go home. ROS same as yesterday, Home O2 received yesterday, 2 hour tank noted today, counseld about indications to return if dicscharged with her sons present, she and her son without developmental disability expressed understanding and agreement, she is aware that I am waiting for lab results as per her Cr for today  [id number]:  Mrs. Olga Edward is a pleasant 61year old lady from home. She has had: increased night sweats, dyspnea on exertion ONLY. She has not had: fever, chills, weak, fatigued, malaise, chest pain, confusion, near syncope, nausea, diarrhea, constipation, and dysuria. Home o2 received today. [id number]:  Mrs. Pierson Money a pleasant 61year old  american lady from [facility]. She has never seen a lung doctor. She was never hospitalized before for her lungs. She has no home oxygen, but had it in the past. She quit smoking 7-8 days ago, completely. She has smoked since age 15, at an avrg of 1.5 PPD for 72 pack-years. She also endorses: fevers for 4 days straight when this started but none in the last 3 days, weakness, dyspnea on exertion, and diaphoresis.   She has chronic unchanged: bed drenching night sweats, and sleeping in chair x years. She also denies: chills, fatigue, malaise, chest pain, confusion, nausea, and near syncope. [id number]: (copied from Dr. Erica Bishop note)  \"The patient is a 57 y.o. female with significant PMH of morbid obesity who does not wear any O2 at home , COPD, HTN, chronic low back pain degenerative disc disease  who presented with progressive SOB coughing was found to have wbc 16.7, dimer elevated, CTa was showing no PE but bilateral pneumonia, pulse ox was very low, PO2 on abg 45, was placed on 4 liters O2, , UA +ve\"    Consults: pulmonary care, pharmacology    Significant Diagnostic Studies:   Narrative   CTA PULMONARY W CONTRAST    2/14/2020 6:43 PM   History: Respiratory distress. Tachycardia and hypoxia. Chest x-ray   shows patchy infiltrate. In order to have a CT radiation dose as low as reasonably achievable   Automated Exposure Control was utilized for adjustment of the mA   and/or KV according to patient size. DLP in mGycm= 720. CT angiography protocol. CT imaging with bolus IV contrast injection. Under concurrent supervision axial, sagittal, coronal, and   three-dimensional data sets were constructed. Comparison is made with a portable chest x-ray from 4:58 PM.   Heart size is within normal limits. There is no thoracic aortic aneurysm or dissection. Symmetric well opacified pulmonary arteries. No pulmonary embolism. Lungs are fully expanded. There is patchy bilateral infiltrate which   is greatest in the lower lobes. A small amount of infiltrate is noted within the right upper lobe. There is no pleural effusion or pneumothorax.       Impression   1. Patchy bilateral infiltrate compatible with pneumonia. 2. No pulmonary embolism. Signed by Dr Earlene Davis on 2/14/2020 6:47 PM       LABS:  Results for Maribeth Carreon (MRN [id number]) as of 2/17/2020 11:44   Ref.  Range 2/14/2020 21:40 2/14/2020 21:47 2/15/2020 Absolute Latest Ref Range: 0.00 - 0.60 K/uL    0.00 0.00   Basophils Absolute Latest Ref Range: 0.00 - 0.20 K/uL    0.00 0.00   MRSA SCREENING CULTURE ONLY Unknown Rpt       MRSA Culture Only Unknown No MRSA detected on culture         Results for Roslyn Ramirez (MRN [id number]) as of 2/17/2020 11:44   Ref. Range 2/14/2020 17:20 2/14/2020 17:30 2/14/2020 17:30 2/14/2020 17:48 2/14/2020 18:20 2/14/2020 21:40   CULTURE BLOOD #1 Unknown Rpt        CULTURE BLOOD #2 Unknown  Rpt (A)       MRSA SCREENING CULTURE ONLY Unknown      Rpt   URINE CULTURE Unknown     Rpt    Culture, Blood 2 Unknown  No further workup. .. Bottle volume = 6 ml (A)      Organism Unknown  Staphylococcus coagulase-negative (A)       Urine Reflex to Culture Unknown     YES    MRSA Culture Only Unknown      No MRSA detected on culture   Rapid Influenza A Ag Latest Ref Range: Negative     Negative     Rapid Influenza B Ag Latest Ref Range: Negative     Negative     RAPID INFLUENZA A/B ANTIGENS Unknown    Rpt         Outstanding Order Results     Date and Time Order Name Status Description    2/14/2020 1720 Culture Blood #1 Preliminary           Treatments: IVF, ABx, Steorids, Breathing Txx    VS:  BP (!) 100/54   Pulse 90   Temp 97.1 °F (36.2 °C)   Resp 16   Ht 5' 6\" (1.676 m)   Wt 277 lb 3.2 oz (125.7 kg)   SpO2 92%   BMI 44.74 kg/m²     Discharge Exam:  Vitals signs reviewed. Constitutional:       General: She is not in acute distress. Appearance: She is obese. She is not ill-appearing or toxic-appearing. HENT:      Head: Normocephalic and atraumatic. Nose: No congestion or rhinorrhea. Eyes:      General:         Right eye: No discharge. Left eye: No discharge. Neck:      Musculoskeletal: Neck supple. Comments: Trachea appears midline  Cardiovascular:      Rate and Rhythm: Normal rate and regular rhythm. Heart sounds: No murmur. No friction rub. No gallop.     Pulmonary:      Effort: Pulmonary effort is normal. No respiratory distress. Breath sounds: No stridor. No rhonchi or rales. Comments: Wheezing but less so  Abdominal:      General: Bowel sounds are normal.      Tenderness: There is no abdominal tenderness. There is no guarding or rebound. Comments: Ha obesity with central tendency   Musculoskeletal:         General: No tenderness. Right lower leg: No edema. Left lower leg: No edema. Skin:     General: Skin is warm. Comments: nondiaphoretic   Neurological:      Mental Status: She is alert.       Comments: Alert, oriented to person and place and situation and date and location   Psychiatric:         Mood and Affect: Mood normal.         Behavior: Behavior normal.        Disposition: home    Current Discharge Medication List   START taking these medications   Medication Dose   azithromycin (ZITHROMAX) 250 MG tablet 250 mg   Take 1 tablet by mouth See Admin Instructions for 2 doses Take one tablet before bed tonight, and one tablet before bed tomorrow   Quantity: 2 tablet Refills: 0       cefpodoxime (VANTIN) 100 MG tablet 100 mg   Take 1 tablet by mouth 2 times daily for 5 doses Take one tablet before bed tonight, take one tablet every AM and PM for the two days after that   Quantity: 5 tablet Refills: 0       pantoprazole (PROTONIX) 40 MG tablet 40 mg   Take 1 tablet by mouth every morning (before breakfast) for 2 doses   Quantity: 30 tablet Refills: 3       predniSONE (DELTASONE) 20 MG tablet 40 mg   Take 2 tablets by mouth daily for 2 days   Quantity: 4 tablet Refills: 0       fluticasone-salmeterol (ADVAIR) 250-50 MCG/DOSE AEPB 1 puff   Inhale 1 puff into the lungs every 12 hours   Quantity: 60 each Refills: 3       Umeclidinium Bromide 62.5 MCG/INH AEPB    1 puff daily   Quantity: 1 each Refills: 2           CONTINUE these medications which have NOT CHANGED   Medication Dose   oxyCODONE-acetaminophen (PERCOCET)  MG per tablet 1 tablet   Take 1 tablet by mouth every 8 hours as needed for Pain. traZODone (DESYREL) 50 MG tablet 50 mg   Take 50 mg by mouth nightly       lisinopril (PRINIVIL;ZESTRIL) 20 MG tablet 20 mg   Take 20 mg by mouth daily       furosemide (LASIX) 40 MG tablet 40 mg   Take 40 mg by mouth daily       aspirin 81 MG tablet 81 mg   Take 81 mg by mouth daily       ALBUTEROL SULFATE HFA IN 90 mcg   Inhale 90 mcg into the lungs as needed           Patient Instructions: Activity: activity as tolerated and no driving for today  Diet: cardiac diet  Wound Care: none needed    Follow-up with PCP in 1-2 weeks.     Please consider following up either with pulmonary medicine in [facility] if that is closer for you   Dr. Alicia Butcher, otherwise [facility] happy to follow up with you as an outpatient   in 4-6 weeks after discharge with CPFT p/p      Signed:  Estuardo Haji  2/17/2020  11:45 AM

## 2020-02-17 NOTE — PROGRESS NOTES
Cr bumped y/d  Labs today still pending    Subjective:   Critical Care Daily Progress Note: 2/17/2020 8:24 AM    Interval History:   [id number]:  Mrs. Faraz Jeffries is a pleasant 61year old lady from home. She has been feeling better and states that she is ready to go home. ROS same as yesterday, Home O2 received yesterday, 2 hour tank noted today, counseld about indications to return if dicscharged with her sons present, she and her son without developmental disability expressed understanding and agreement, she is aware that I am waiting for lab results as per her Cr for today  [id number]:  Mrs. Faraz Jeffries is a pleasant 61year old lady from home. She has had: increased night sweats, dyspnea on exertion ONLY. She has not had: fever, chills, weak, fatigued, malaise, chest pain, confusion, near syncope, nausea, diarrhea, constipation, and dysuria. Home o2 received today. [id number]:  Mrs. Newman Job a pleasant 61year old  american lady from [facility]. She has never seen a lung doctor. She was never hospitalized before for her lungs. She has no home oxygen, but had it in the past. She quit smoking 7-8 days ago, completely. She has smoked since age 15, at an avrg of 1.5 PPD for 72 pack-years. She also endorses: fevers for 4 days straight when this started but none in the last 3 days, weakness, dyspnea on exertion, and diaphoresis. She has chronic unchanged: bed drenching night sweats, and sleeping in chair x years. She also denies: chills, fatigue, malaise, chest pain, confusion, nausea, and near syncope.    [id number]: (copied from Dr. Ilsa Rosa note)  \"The patient is a 57 y.o. female with significant PMH of morbid obesity who does not wear any O2 at home , COPD, HTN, chronic low back pain degenerative disc disease  who presented with progressive SOB coughing was found to have wbc 16.7, dimer elevated, CTa was showing no PE but bilateral pneumonia, pulse ox was very low, PO2 on abg 45, was placed on 4 liters O2, , UA +ve\"    Scheduled Meds:   predniSONE  40 mg Oral Daily    vancomycin  1,750 mg Intravenous Q24H    vancomycin (VANCOCIN) intermittent dosing (placeholder)   Other RX Placeholder    furosemide  40 mg Oral Daily    aspirin  81 mg Oral Daily    lisinopril  20 mg Oral Daily    traZODone  50 mg Oral Nightly    ipratropium-albuterol  1 ampule Inhalation 4x daily    azithromycin  500 mg Intravenous Q24H    budesonide  0.5 mg Nebulization Q12H    pantoprazole  40 mg Oral QAM AC    sodium chloride flush  10 mL Intravenous 2 times per day    enoxaparin  40 mg Subcutaneous Daily    cefTRIAXone (ROCEPHIN) IV  1 g Intravenous Q24H     Continuous Infusions:   sodium chloride 75 mL/hr at 02/15/20 1228     PRN Meds:oxyCODONE-acetaminophen, albuterol, naloxone, sodium chloride flush, magnesium hydroxide, ondansetron, potassium chloride **OR** potassium alternative oral replacement **OR** potassium chloride, magnesium sulfate, acetaminophen    Review of Systems  Pertinent items are noted in HPI. Objective:     No intake/output data recorded. I/O this shift: In: 480 [P.O.:480]  Out: -     /60   Pulse 73   Temp 97.1 °F (36.2 °C)   Resp 20   Ht 5' 6\" (1.676 m)   Wt 277 lb 3.2 oz (125.7 kg)   SpO2 95%   BMI 44.74 kg/m²     Physical Exam  Vitals signs reviewed. Constitutional:       General: She is not in acute distress. Appearance: She is obese. She is not ill-appearing or toxic-appearing. HENT:      Head: Normocephalic and atraumatic. Nose: No congestion or rhinorrhea. Eyes:      General:         Right eye: No discharge. Left eye: No discharge. Neck:      Musculoskeletal: Neck supple. Comments: Trachea appears midline  Cardiovascular:      Rate and Rhythm: Normal rate and regular rhythm. Heart sounds: No murmur. No friction rub. No gallop. Pulmonary:      Effort: Pulmonary effort is normal. No respiratory distress. Breath sounds: No stridor.  No rhonchi or

## 2020-02-17 NOTE — PLAN OF CARE
Problem: Discharge Planning:  Goal: Patients continuum of care needs are met  Description  Patients continuum of care needs are met  Outcome: Ongoing

## 2020-02-18 LAB
EKG P AXIS: 68 DEGREES
EKG P-R INTERVAL: 138 MS
EKG Q-T INTERVAL: 314 MS
EKG QRS DURATION: 124 MS
EKG QTC CALCULATION (BAZETT): 422 MS
EKG T AXIS: 49 DEGREES

## 2020-02-19 LAB — BLOOD CULTURE, ROUTINE: NORMAL
